# Patient Record
Sex: FEMALE | Race: WHITE | NOT HISPANIC OR LATINO | Employment: FULL TIME | URBAN - METROPOLITAN AREA
[De-identification: names, ages, dates, MRNs, and addresses within clinical notes are randomized per-mention and may not be internally consistent; named-entity substitution may affect disease eponyms.]

---

## 2017-01-17 RX ORDER — ETONOGESTREL/ETHINYL ESTRADIOL .12-.015MG
RING, VAGINAL VAGINAL
Qty: 3 EACH | Refills: 2 | OUTPATIENT
Start: 2017-01-17

## 2017-03-14 RX ORDER — ETONOGESTREL/ETHINYL ESTRADIOL .12-.015MG
RING, VAGINAL VAGINAL
Qty: 3 EACH | Refills: 1 | Status: SHIPPED | OUTPATIENT
Start: 2017-03-14 | End: 2017-11-10 | Stop reason: SDUPTHER

## 2017-11-10 ENCOUNTER — OFFICE VISIT (OUTPATIENT)
Dept: OBSTETRICS AND GYNECOLOGY | Facility: CLINIC | Age: 32
End: 2017-11-10

## 2017-11-10 VITALS
DIASTOLIC BLOOD PRESSURE: 80 MMHG | BODY MASS INDEX: 45.31 KG/M2 | HEIGHT: 61 IN | SYSTOLIC BLOOD PRESSURE: 128 MMHG | WEIGHT: 240 LBS

## 2017-11-10 DIAGNOSIS — Z13.89 SCREENING FOR BLOOD OR PROTEIN IN URINE: ICD-10-CM

## 2017-11-10 DIAGNOSIS — E66.01 MORBID OBESITY WITH BMI OF 45.0-49.9, ADULT (HCC): ICD-10-CM

## 2017-11-10 DIAGNOSIS — Z11.3 SCREENING FOR STD (SEXUALLY TRANSMITTED DISEASE): ICD-10-CM

## 2017-11-10 DIAGNOSIS — Z12.4 PAP SMEAR FOR CERVICAL CANCER SCREENING: ICD-10-CM

## 2017-11-10 DIAGNOSIS — Z01.419 WELL FEMALE EXAM WITH ROUTINE GYNECOLOGICAL EXAM: Primary | ICD-10-CM

## 2017-11-10 LAB
BILIRUB BLD-MCNC: NEGATIVE MG/DL
CLARITY, POC: CLEAR
COLOR UR: YELLOW
GLUCOSE UR STRIP-MCNC: NEGATIVE MG/DL
KETONES UR QL: NEGATIVE
LEUKOCYTE EST, POC: NEGATIVE
NITRITE UR-MCNC: NEGATIVE MG/ML
PH UR: 6.5 [PH] (ref 5–8)
PROT UR STRIP-MCNC: ABNORMAL MG/DL
RBC # UR STRIP: ABNORMAL /UL
SP GR UR: 1.02 (ref 1–1.03)
UROBILINOGEN UR QL: NORMAL

## 2017-11-10 PROCEDURE — 99395 PREV VISIT EST AGE 18-39: CPT | Performed by: OBSTETRICS & GYNECOLOGY

## 2017-11-10 PROCEDURE — 81002 URINALYSIS NONAUTO W/O SCOPE: CPT | Performed by: OBSTETRICS & GYNECOLOGY

## 2017-11-10 RX ORDER — ETONOGESTREL AND ETHINYL ESTRADIOL 11.7; 2.7 MG/1; MG/1
1 INSERT, EXTENDED RELEASE VAGINAL
Qty: 3 EACH | Refills: 4 | Status: SHIPPED | OUTPATIENT
Start: 2017-11-10 | End: 2018-11-14 | Stop reason: SDUPTHER

## 2017-11-10 NOTE — PROGRESS NOTES
[facility]  Routine Annual Visit    11/10/2017    Patient: Agustina Cisneros          MR#:[id number]      History of Present Illness    32 y.o. female  who presents for annual exam.        Patient's last menstrual period was 2017.  Obstetric History:  OB History      Para Term  AB Living    2 2 2   2    SAB TAB Ectopic Multiple Live Births        2         Menstrual History:     Patient's last menstrual period was 2017.       Sexual History:       ________________________________________  Patient Active Problem List   Diagnosis   • Well female exam with routine gynecological exam   • Morbid obesity with BMI of 45.0-49.9, adult       Past Medical History:   Diagnosis Date   • GERD (gastroesophageal reflux disease)    • Head ache     premenstral       Past Surgical History:   Procedure Laterality Date   •  SECTION         History   Smoking Status   • Never Smoker   Smokeless Tobacco   • Never Used       Family History   Problem Relation Age of Onset   • Asthma Mother    • Diabetes Father    • Lung cancer Maternal Grandmother 75   • Prostate cancer Maternal Grandfather 78   • Breast cancer Neg Hx    • Ovarian cancer Neg Hx    • Uterine cancer Neg Hx    • Colon cancer Neg Hx    • Melanoma Neg Hx        Prior to Admission medications    Medication Sig Start Date End Date Taking? Authorizing Provider   NUVARING 0.12-0.015 MG/24HR vaginal ring USE AS DIRECTED 3/14/17  Yes Merrick Naranjo MD     ________________________________________    Current contraception: NuvaRing vaginal inserts  History of abnormal Pap smear: no  Family history of uterine or ovarian cancer: no  Family History of colon cancer/colon polyps: no  History of abnormal mammogram: no  History of abnormal lipids: no    The following portions of the patient's history were reviewed and updated as appropriate: allergies, current medications, past family history, past medical history, past social  "history, past surgical history and problem list.    Review of Systems    Pertinent items are noted in HPI.     Objective   Physical Exam    /80  Ht 61\" (154.9 cm)  Wt 240 lb (109 kg)  LMP 11/01/2017  BMI 45.35 kg/m2   BP Readings from Last 3 Encounters:   11/10/17 128/80      Wt Readings from Last 3 Encounters:   11/10/17 240 lb (109 kg)        BMI: Estimated body mass index is 45.35 kg/(m^2) as calculated from the following:    Height as of this encounter: 61\" (154.9 cm).    Weight as of this encounter: 240 lb (109 kg).    General:   alert, appears stated age and cooperative   Heart: regular rate and rhythm, S1, S2 normal, no murmur, click, rub or gallop   Lungs: clear to auscultation bilaterally   Abdomen: soft, non-tender, without masses or organomegaly and obese   Breast: inspection negative, no nipple discharge or bleeding, no masses or nodularity palpable   Vulva: normal   Vagina: normal mucosa   Cervix: no lesions   Uterus: normal size or exam limits habitus   Adnexa: exam limited by habitus     As part of wellness and prevention, the following topics were discussed with the patient:     []  Nutrition  []  Physical activity/regular exercise   []  Healthy weight  []  Injury prevention  []  Substance misuse/abuse  []  Sexual behavior  []  STD prevention  [x]  Contaception  []  Dental health  []  Mental health  []  Immunization  [x]  Encouraged SBE       Assessment:    Agustina was seen today for gynecologic exam.    Diagnoses and all orders for this visit:    Well female exam with routine gynecological exam    Screening for blood or protein in urine  -     POC Urinalysis Dipstick    Pap smear for cervical cancer screening  -     IgP, Aptima HPV - ThinPrep Vial, Cervix    Morbid obesity with BMI of 45.0-49.9, adult    Screening for STD (sexually transmitted disease)  -     Chlamydia trachomatis, Neisseria gonorrhoeae, Trichomonas vaginalis, PCR - Swab, Cervix    Other orders  -     " etonogestrel-ethinyl estradiol (NUVARING) 0.12-0.015 MG/24HR vaginal ring; Insert 1 each into the vagina Every 28 (Twenty-Eight) Days.          Plan:  Return in about 1 year (around 11/10/2018) for Annual GYN exam.      Merrick Naranjo MD  11/10/2017 11:59 AM

## 2017-11-14 LAB
C TRACH RRNA SPEC QL NAA+PROBE: NEGATIVE
N GONORRHOEA RRNA SPEC QL NAA+PROBE: NEGATIVE
T VAGINALIS RRNA SPEC QL NAA+PROBE: NEGATIVE

## 2017-11-15 LAB
CYTOLOGIST CVX/VAG CYTO: NORMAL
CYTOLOGY CVX/VAG DOC THIN PREP: NORMAL
DX ICD CODE: NORMAL
HIV 1 & 2 AB SER-IMP: NORMAL
HPV I/H RISK 4 DNA CVX QL PROBE+SIG AMP: NEGATIVE
Lab: NORMAL
OTHER STN SPEC: NORMAL
PATH REPORT.FINAL DX SPEC: NORMAL
STAT OF ADQ CVX/VAG CYTO-IMP: NORMAL

## 2017-11-16 ENCOUNTER — TELEPHONE (OUTPATIENT)
Dept: OBSTETRICS AND GYNECOLOGY | Facility: CLINIC | Age: 32
End: 2017-11-16

## 2017-11-16 NOTE — TELEPHONE ENCOUNTER
----- Message from Merrick Naranjo MD sent at 11/15/2017 12:54 PM EST -----  Call pt:  PAP is negative.

## 2018-11-14 ENCOUNTER — OFFICE VISIT (OUTPATIENT)
Dept: OBSTETRICS AND GYNECOLOGY | Age: 33
End: 2018-11-14

## 2018-11-14 VITALS
DIASTOLIC BLOOD PRESSURE: 78 MMHG | WEIGHT: 246 LBS | HEIGHT: 61 IN | SYSTOLIC BLOOD PRESSURE: 104 MMHG | BODY MASS INDEX: 46.44 KG/M2

## 2018-11-14 DIAGNOSIS — Z13.89 SCREENING FOR BLOOD OR PROTEIN IN URINE: ICD-10-CM

## 2018-11-14 DIAGNOSIS — Z11.3 SCREENING FOR STD (SEXUALLY TRANSMITTED DISEASE): ICD-10-CM

## 2018-11-14 DIAGNOSIS — Z12.4 PAP SMEAR FOR CERVICAL CANCER SCREENING: ICD-10-CM

## 2018-11-14 DIAGNOSIS — E66.01 MORBID OBESITY WITH BMI OF 45.0-49.9, ADULT (HCC): ICD-10-CM

## 2018-11-14 DIAGNOSIS — Z01.419 WELL FEMALE EXAM WITH ROUTINE GYNECOLOGICAL EXAM: Primary | ICD-10-CM

## 2018-11-14 LAB
BILIRUB BLD-MCNC: NEGATIVE MG/DL
CLARITY, POC: CLEAR
COLOR UR: YELLOW
GLUCOSE UR STRIP-MCNC: NEGATIVE MG/DL
KETONES UR QL: NEGATIVE
LEUKOCYTE EST, POC: NEGATIVE
NITRITE UR-MCNC: NEGATIVE MG/ML
PH UR: 6 [PH] (ref 5–8)
PROT UR STRIP-MCNC: ABNORMAL MG/DL
RBC # UR STRIP: ABNORMAL /UL
SP GR UR: 1.03 (ref 1–1.03)
UROBILINOGEN UR QL: NORMAL

## 2018-11-14 PROCEDURE — 81002 URINALYSIS NONAUTO W/O SCOPE: CPT | Performed by: OBSTETRICS & GYNECOLOGY

## 2018-11-14 PROCEDURE — 99395 PREV VISIT EST AGE 18-39: CPT | Performed by: OBSTETRICS & GYNECOLOGY

## 2018-11-14 RX ORDER — ETONOGESTREL AND ETHINYL ESTRADIOL 11.7; 2.7 MG/1; MG/1
1 INSERT, EXTENDED RELEASE VAGINAL
Qty: 1 EACH | Refills: 12 | Status: SHIPPED | OUTPATIENT
Start: 2018-11-14 | End: 2019-11-14

## 2018-11-14 NOTE — PROGRESS NOTES
Routine Annual Visit    2018    Patient: Agustina Cisneros          MR#:[id number]      History of Present Illness    Chief Complaint   Patient presents with   • Gynecologic Exam       33 y.o. female  who presents for annual exam.    The patient presents for routine annual exam feeling well without complaints.  She uses NuvaRing for contraception and has been very happy with this method.    Patient's last menstrual period was 2018 (approximate).  Obstetric History:  OB History      Para Term  AB Living    2 2 2     2    SAB TAB Ectopic Molar Multiple Live Births              2         Menstrual History:     Patient's last menstrual period was 2018 (approximate).       Sexual History:       ________________________________________  Patient Active Problem List   Diagnosis   • Well female exam with routine gynecological exam   • Morbid obesity with BMI of 45.0-49.9, adult (CMS/[facility])       Past Medical History:   Diagnosis Date   • GERD (gastroesophageal reflux disease)    • Head ache     premenstral       Past Surgical History:   Procedure Laterality Date   •  SECTION     • WISDOM TOOTH EXTRACTION         Social History     Tobacco Use   Smoking Status Never Smoker   Smokeless Tobacco Never Used       Family History   Problem Relation Age of Onset   • Asthma Mother    • Diabetes Father    • Lung cancer Maternal Grandmother 75   • Diabetes Maternal Grandmother    • Prostate cancer Maternal Grandfather 78   • Breast cancer Neg Hx    • Ovarian cancer Neg Hx    • Uterine cancer Neg Hx    • Colon cancer Neg Hx    • Melanoma Neg Hx        Prior to Admission medications    Medication Sig Start Date End Date Taking? Authorizing Provider   etonogestrel-ethinyl estradiol (NUVARING) 0.12-0.015 MG/24HR vaginal ring Insert 1 each into the vagina Every 28 (Twenty-Eight) Days. 11/10/17  Yes Merrick Naranjo MD     ________________________________________    Current contraception:  "Nuvaring  History of abnormal Pap smear: no  Family history of uterine or ovarian cancer: no  Family History of colon cancer/colon polyps: no  History of abnormal mammogram: no  History of abnormal lipids: no    The following portions of the patient's history were reviewed and updated as appropriate: allergies, current medications, past family history, past medical history, past social history, past surgical history and problem list.    Review of Systems    Pertinent items are noted in HPI.     Objective   Physical Exam    /78   Ht 154.9 cm (61\")   Wt 112 kg (246 lb)   LMP 11/07/2018 (Approximate)   BMI 46.48 kg/m²    BP Readings from Last 3 Encounters:   11/14/18 104/78   11/10/17 128/80      Wt Readings from Last 3 Encounters:   11/14/18 112 kg (246 lb)   11/10/17 109 kg (240 lb)        BMI: Estimated body mass index is 46.48 kg/m² as calculated from the following:    Height as of this encounter: 154.9 cm (61\").    Weight as of this encounter: 112 kg (246 lb).       General: alert, appears stated age, cooperative and moderately obese   Heart: regular rate and rhythm, S1, S2 normal, no murmur, click, rub or gallop   Lungs: clear to auscultation bilaterally   Abdomen: soft, non-tender, without masses or organomegaly and protuberant   Breast: inspection negative, no nipple discharge or bleeding, no masses or nodularity palpable   Vulva: normal, Bartholin's, Urethra, [place]'s normal   Vagina: normal mucosa, normal discharge   Cervix: no lesions   Uterus: normal size or habitus limits exam    Adnexa: exam limited by habitus     As part of wellness and prevention, the following topics were discussed with the patient:     []  Nutrition  [x]  Physical activity/regular exercise   [x]  Healthy weight  []  Injury prevention  []  Smoking cessation  []  Substance misuse/abuse  []  Sexual behavior  []  STD prevention  []  Contaception  []  Dental health  []  Mental health  []  Immunization  [x]  Encouraged SBE  "       Assessment:    Agustina was seen today for gynecologic exam.    Diagnoses and all orders for this visit:    Well female exam with routine gynecological exam  -     IgP, Aptima HPV    Screening for blood or protein in urine  -     POC Urinalysis Dipstick    Pap smear for cervical cancer screening  -     IgP, Aptima HPV    Morbid obesity with BMI of 45.0-49.9, adult (CMS/[facility])    Screening for STD (sexually transmitted disease)  -     Chlamydia trachomatis, Neisseria gonorrhoeae, Trichomonas vaginalis, PCR - Swab, Cervix    Other orders  -     etonogestrel-ethinyl estradiol (NUVARING) 0.12-0.015 MG/24HR vaginal ring; Insert 1 each into the vagina Every 28 (Twenty-Eight) Days.          Plan:  Return in about 1 year (around 11/14/2019) for Annual GYN exam.      Merrick Naranjo MD  11/14/2018 11:59 AM

## 2018-11-16 LAB
C TRACH RRNA SPEC QL NAA+PROBE: NEGATIVE
N GONORRHOEA RRNA SPEC QL NAA+PROBE: NEGATIVE
T VAGINALIS RRNA VAG QL NAA+PROBE: NEGATIVE

## 2018-11-20 ENCOUNTER — TELEPHONE (OUTPATIENT)
Dept: OBSTETRICS AND GYNECOLOGY | Age: 33
End: 2018-11-20

## 2018-11-20 NOTE — TELEPHONE ENCOUNTER
----- Message from Merrick Naranjo MD sent at 11/19/2018  4:22 PM EST -----  Call pt:  PAP is negative.

## 2020-01-31 RX ORDER — ETONOGESTREL/ETHINYL ESTRADIOL .12-.015MG
RING, VAGINAL VAGINAL
Qty: 3 EACH | Refills: 4 | OUTPATIENT
Start: 2020-01-31

## 2023-11-22 ENCOUNTER — OFFICE VISIT (OUTPATIENT)
Dept: OBSTETRICS AND GYNECOLOGY | Age: 38
End: 2023-11-22
Payer: COMMERCIAL

## 2023-11-22 VITALS
BODY MASS INDEX: 47.95 KG/M2 | HEIGHT: 61 IN | WEIGHT: 254 LBS | DIASTOLIC BLOOD PRESSURE: 64 MMHG | SYSTOLIC BLOOD PRESSURE: 122 MMHG

## 2023-11-22 DIAGNOSIS — E66.01 MORBID OBESITY WITH BMI OF 45.0-49.9, ADULT: ICD-10-CM

## 2023-11-22 DIAGNOSIS — Z11.3 SCREENING EXAMINATION FOR STD (SEXUALLY TRANSMITTED DISEASE): ICD-10-CM

## 2023-11-22 DIAGNOSIS — Z11.51 SCREENING FOR HUMAN PAPILLOMAVIRUS (HPV): ICD-10-CM

## 2023-11-22 DIAGNOSIS — Z12.4 SCREENING FOR MALIGNANT NEOPLASM OF CERVIX: ICD-10-CM

## 2023-11-22 DIAGNOSIS — Z01.419 WELL FEMALE EXAM WITH ROUTINE GYNECOLOGICAL EXAM: Primary | ICD-10-CM

## 2023-11-22 DIAGNOSIS — K62.5 RECTAL BLEEDING: ICD-10-CM

## 2023-11-22 RX ORDER — ETONOGESTREL AND ETHINYL ESTRADIOL VAGINAL .015; .12 MG/D; MG/D
1 RING VAGINAL
Qty: 1 EACH | Refills: 12 | Status: SHIPPED | OUTPATIENT
Start: 2023-11-22 | End: 2024-11-21

## 2023-11-22 NOTE — PROGRESS NOTES
[facility]   Obstetrics and Gynecology     2023    Patient: Agustina Cisneros          MR#:[id number]    History of Present Illness    Chief Complaint   Patient presents with    Gynecologic Exam     CC: New gyn, former patient, AE, last pap 2018 nml HPV neg, pt states blood in stool x2 months.       38 y.o. female  who presents for annual exam.    The patient was her previous established in the practice has not been seen since 2018.  She reports rectal bleeding 4-5 times a week for the past 2 months.    Relevant data reviewed:    Patient's last menstrual period was 2023 (exact date).  Obstetric History:  OB History          2    Para   2    Term   2            AB        Living   2         SAB        IAB        Ectopic        Molar        Multiple        Live Births   2               Menstrual History:     Patient's last menstrual period was 2023 (exact date).       Social History     Substance and Sexual Activity   Sexual Activity Yes    Partners: Male    Birth control/protection: Inserts    Comment: nuva ring     ______________________________________  Patient Active Problem List   Diagnosis    Morbid obesity with BMI of 45.0-49.9, adult    Rectal bleeding     Past Medical History:   Diagnosis Date    GERD (gastroesophageal reflux disease)     Head ache     premenstral     Past Surgical History:   Procedure Laterality Date     SECTION      WISDOM TOOTH EXTRACTION       Social History     Tobacco Use   Smoking Status Never   Smokeless Tobacco Never     Family History   Problem Relation Age of Onset    Asthma Mother     Diabetes Father     Lung cancer Maternal Grandmother 75    Diabetes Maternal Grandmother     Prostate cancer Maternal Grandfather 78    Breast cancer Neg Hx     Ovarian cancer Neg Hx     Uterine cancer Neg Hx     Colon cancer Neg Hx     Melanoma Neg Hx      Prior to Admission medications    Not on File  "    _______________________________________    Current contraception: none  History of abnormal Pap smear: no  Family history of uterine or ovarian cancer: no  Family History of colon cancer/colon polyps: no  History of abnormal mammogram: no  History of abnormal lipids: no    The following portions of the patient's history were reviewed and updated as appropriate: allergies, current medications, past family history, past medical history, past social history, past surgical history, and problem list.    Review of Systems    Pertinent items are noted in HPI.       Objective   Physical Exam    /64   Ht 154.9 cm (61\")   Wt 115 kg (254 lb)   LMP 11/09/2023 (Exact Date)   BMI 47.99 kg/m²    BP Readings from Last 3 Encounters:   11/22/23 122/64   11/14/18 104/78   11/10/17 128/80      Wt Readings from Last 3 Encounters:   11/22/23 115 kg (254 lb)   11/14/18 112 kg (246 lb)   11/10/17 109 kg (240 lb)        BMI: Estimated body mass index is 47.99 kg/m² as calculated from the following:    Height as of this encounter: 154.9 cm (61\").    Weight as of this encounter: 115 kg (254 lb).       General: alert, appears stated age, cooperative, and moderately obese   Heart: regular rate and rhythm, S1, S2 normal, no murmur, click, rub or gallop   Lungs: clear to auscultation bilaterally   Abdomen: soft, non-tender, without masses, no organomegaly   Breast: inspection negative, no nipple discharge or bleeding, no masses or nodularity palpable   External genitalia/Vulva: External genitalia including bartholin's glands, Urethra, [place]'s gland and urethra meatus are normal, Perineum, rectum and anus appear normal , and Bladder appears normal without significant prolapse    Vagina: normal mucosa, normal discharge   Cervix: no lesions   Uterus: normal size and non-tender   Adnexa: exam limited by habitus     As part of wellness and prevention, the following topics were discussed with the patient:  Encouraged self breast " exam  Physical activity and regular exercised encouraged.         Problem List   Meds  History  Prep for Surg   Imagin}    Assessment:  Diagnoses and all orders for this visit:    1. Well female exam with routine gynecological exam (Primary)  -     IGP, Apt HPV,rfx 16 / 18,45    2. Screening for human papillomavirus (HPV)  -     IGP, Apt HPV,rfx 16 / 18,45    3. Screening for malignant neoplasm of cervix  -     IGP, Apt HPV,rfx 16 / 18,45    4. Morbid obesity with BMI of 45.0-49.9, adult    5. Rectal bleeding  -     Ambulatory Referral For Screening Colonoscopy    Other orders  -     etonogestrel-ethinyl estradiol (NuvaRing) 0.12-0.015 MG/24HR vaginal ring; Insert 1 each into the vagina Every 28 (Twenty-Eight) Days. Insert vaginally and leave in place for 3 consecutive weeks, then remove for 1 week.  Dispense: 1 each; Refill: 12      Plan:  Return in 1 year (on 2024) for Annual exam.    Merrick Naranjo MD  2023 11:20 EST

## 2023-11-26 LAB
A VAGINAE DNA VAG QL NAA+PROBE: NORMAL SCORE
BVAB2 DNA VAG QL NAA+PROBE: NORMAL SCORE
C ALBICANS DNA VAG QL NAA+PROBE: NEGATIVE
C GLABRATA DNA VAG QL NAA+PROBE: NEGATIVE
C TRACH DNA VAG QL NAA+PROBE: NEGATIVE
MEGA1 DNA VAG QL NAA+PROBE: NORMAL SCORE
N GONORRHOEA DNA VAG QL NAA+PROBE: NEGATIVE
T VAGINALIS DNA VAG QL NAA+PROBE: NEGATIVE

## 2023-11-27 ENCOUNTER — PATIENT ROUNDING (BHMG ONLY) (OUTPATIENT)
Dept: OBSTETRICS AND GYNECOLOGY | Age: 38
End: 2023-11-27
Payer: COMMERCIAL

## 2023-11-27 LAB
CYTOLOGIST CVX/VAG CYTO: NORMAL
CYTOLOGY CVX/VAG DOC CYTO: NORMAL
CYTOLOGY CVX/VAG DOC THIN PREP: NORMAL
DX ICD CODE: NORMAL
HIV 1 & 2 AB SER-IMP: NORMAL
HPV I/H RISK 4 DNA CVX QL PROBE+SIG AMP: NEGATIVE
OTHER STN SPEC: NORMAL
STAT OF ADQ CVX/VAG CYTO-IMP: NORMAL

## 2023-11-27 NOTE — PROGRESS NOTES
A MY CHART MESSAGE HAS BEEN SENT TO THE PATIENT FOR [facility].

## 2023-12-05 ENCOUNTER — OFFICE VISIT (OUTPATIENT)
Dept: SURGERY | Facility: CLINIC | Age: 38
End: 2023-12-05
Payer: COMMERCIAL

## 2023-12-05 VITALS
BODY MASS INDEX: 48.64 KG/M2 | SYSTOLIC BLOOD PRESSURE: 150 MMHG | WEIGHT: 257.6 LBS | DIASTOLIC BLOOD PRESSURE: 98 MMHG | HEIGHT: 61 IN

## 2023-12-05 DIAGNOSIS — K62.5 RECTAL BLEEDING: Primary | ICD-10-CM

## 2023-12-05 NOTE — PROGRESS NOTES
Subjective   Agustina Cisneros is a 38 y.o. female who presents to the office in surgical consultation from Merrick Naranjo MD for rectal bleeding.    History of present illness  The patient has a several week history of rectal bleeding.  She notices blood in the substance of her stool as well as on the surface and on the toilet paper.  She does not have any associated abdominal pain.  She has not had bloody diarrhea.  Her appetite has remained normal and she is having regular bowel function.  She has never had a previous colonoscopy.  She has a family history of colon cancer in her grandfather who was diagnosed in his mid to late 60s.    Review of Systems   Constitutional:  Negative for fatigue and fever.   Respiratory:  Negative for chest tightness and shortness of breath.    Cardiovascular:  Negative for chest pain and palpitations.   Gastrointestinal:  Positive for blood in stool and hematochezia. Negative for abdominal pain, constipation, diarrhea, nausea and vomiting.     Past Medical History:   Diagnosis Date    GERD (gastroesophageal reflux disease)     Head ache     premenstral     Past Surgical History:   Procedure Laterality Date     SECTION  2012    WISDOM TOOTH EXTRACTION       Family History   Problem Relation Age of Onset    Asthma Mother     Diabetes Father     Lung cancer Maternal Grandmother 75    Diabetes Maternal Grandmother     Prostate cancer Maternal Grandfather 78    Breast cancer Neg Hx     Ovarian cancer Neg Hx     Uterine cancer Neg Hx     Colon cancer Neg Hx     Melanoma Neg Hx      Social History     Socioeconomic History    Marital status:      Spouse name: SONIA    Number of children: 2   Tobacco Use    Smoking status: Never    Smokeless tobacco: Never   Vaping Use    Vaping Use: Never used   Substance and Sexual Activity    Alcohol use: No    Drug use: No    Sexual activity: Yes     Partners: Male     Birth control/protection: Inserts     Comment: nuva ring        Objective   Physical Exam  Constitutional:       Appearance: Normal appearance. She is well-developed. She is not toxic-appearing.   Eyes:      General: No scleral icterus.  Pulmonary:      Effort: Pulmonary effort is normal. No respiratory distress.   Abdominal:      Palpations: Abdomen is soft.      Tenderness: There is no abdominal tenderness.   Skin:     General: Skin is warm and dry.   Neurological:      Mental Status: She is alert and oriented to person, place, and time.   Psychiatric:         Behavior: Behavior normal.         Thought Content: Thought content normal.         Judgment: Judgment normal.         Assessment & Plan     1.  Rectal bleeding: The patient has rectal bleeding that seems to include blood in the substance of the stool.  This would suggest that the source is not just perianal such as a hemorrhoid or a fissure.  There is concern that she may have a colonic source of the rectal bleeding.  This was discussed with her.  We will plan to proceed with a colonoscopy for further evaluation.  The patient understands the indications, alternatives, risks, and benefits of the procedure and wishes to proceed.

## 2023-12-05 NOTE — LETTER
2023     Merrick Naranjo MD     Patient: Agustina Cisneros   YOB: 1985   Date of Visit: 2023     Dear Merrick Naranjo MD:       Thank you for referring Agustina Cisneros to me for evaluation. Below are the relevant portions of my assessment and plan of care.    If you have questions, please do not hesitate to call me. I look forward to following Agustina along with you.         Sincerely,        Reji Mcdowell Jr., MD        CC:   No Recipients    Reji Mcdowell Jr., MD  23 1537  Sign when Signing Visit  Subjective  Agustina Cisneros is a 38 y.o. female who presents to the office in surgical consultation from Merrick Naranjo MD for rectal bleeding.    History of present illness  The patient has a several week history of rectal bleeding.  She notices blood in the substance of her stool as well as on the surface and on the toilet paper.  She does not have any associated abdominal pain.  She has not had bloody diarrhea.  Her appetite has remained normal and she is having regular bowel function.  She has never had a previous colonoscopy.  She has a family history of colon cancer in her grandfather who was diagnosed in his mid to late 60s.    Review of Systems   Constitutional:  Negative for fatigue and fever.   Respiratory:  Negative for chest tightness and shortness of breath.    Cardiovascular:  Negative for chest pain and palpitations.   Gastrointestinal:  Positive for blood in stool and hematochezia. Negative for abdominal pain, constipation, diarrhea, nausea and vomiting.     Past Medical History:   Diagnosis Date   • GERD (gastroesophageal reflux disease)    • Head ache     premenstral     Past Surgical History:   Procedure Laterality Date   •  SECTION     • WISDOM TOOTH EXTRACTION       Family History   Problem Relation Age of Onset   • Asthma Mother    • Diabetes Father    • Lung cancer Maternal Grandmother 75   • Diabetes Maternal Grandmother    • Prostate  cancer Maternal Grandfather 78   • Breast cancer Neg Hx    • Ovarian cancer Neg Hx    • Uterine cancer Neg Hx    • Colon cancer Neg Hx    • Melanoma Neg Hx      Social History     Socioeconomic History   • Marital status:      Spouse name: SONIA   • Number of children: 2   Tobacco Use   • Smoking status: Never   • Smokeless tobacco: Never   Vaping Use   • Vaping Use: Never used   Substance and Sexual Activity   • Alcohol use: No   • Drug use: No   • Sexual activity: Yes     Partners: Male     Birth control/protection: Inserts     Comment: nuva ring       Objective  Physical Exam  Constitutional:       Appearance: Normal appearance. She is well-developed. She is not toxic-appearing.   Eyes:      General: No scleral icterus.  Pulmonary:      Effort: Pulmonary effort is normal. No respiratory distress.   Abdominal:      Palpations: Abdomen is soft.      Tenderness: There is no abdominal tenderness.   Skin:     General: Skin is warm and dry.   Neurological:      Mental Status: She is alert and oriented to person, place, and time.   Psychiatric:         Behavior: Behavior normal.         Thought Content: Thought content normal.         Judgment: Judgment normal.         Assessment & Plan    1.  Rectal bleeding: The patient has rectal bleeding that seems to include blood in the substance of the stool.  This would suggest that the source is not just perianal such as a hemorrhoid or a fissure.  There is concern that she may have a colonic source of the rectal bleeding.  This was discussed with her.  We will plan to proceed with a colonoscopy for further evaluation.  The patient understands the indications, alternatives, risks, and benefits of the procedure and wishes to proceed.

## 2023-12-07 ENCOUNTER — PATIENT ROUNDING (BHMG ONLY) (OUTPATIENT)
Dept: SURGERY | Facility: CLINIC | Age: 38
End: 2023-12-07
Payer: COMMERCIAL

## 2023-12-07 NOTE — PROGRESS NOTES
December 7, 2023    Hello, may I speak with Agustina Cisneros?    My name is Patel       I am  with [facility]  [address]  [place] [id number] [phone].    Before we get started may I verify your date of birth? 1985    I am calling to officially welcome you to our practice and ask about your recent visit. Is this a good time to talk? yes    Tell me about your visit with us. What things went well?  Everything went well.        We're always looking for ways to make our patients' experiences even better. Do you have recommendations on ways we may improve?  no    Overall were you satisfied with your first visit to our practice? yes       I appreciate you taking the time to speak with me today. Is there anything else I can do for you? no      Thank you, and have a great day.

## 2024-01-05 ENCOUNTER — ANESTHESIA (OUTPATIENT)
Dept: GASTROENTEROLOGY | Facility: HOSPITAL | Age: 39
End: 2024-01-05
Payer: COMMERCIAL

## 2024-01-05 ENCOUNTER — HOSPITAL ENCOUNTER (OUTPATIENT)
Facility: HOSPITAL | Age: 39
Setting detail: HOSPITAL OUTPATIENT SURGERY
Discharge: HOME | End: 2024-01-05
Attending: SURGERY | Admitting: SURGERY
Payer: COMMERCIAL

## 2024-01-05 ENCOUNTER — ANESTHESIA EVENT (OUTPATIENT)
Dept: GASTROENTEROLOGY | Facility: HOSPITAL | Age: 39
End: 2024-01-05
Payer: COMMERCIAL

## 2024-01-05 VITALS
WEIGHT: 249.5 LBS | BODY MASS INDEX: 47.11 KG/M2 | RESPIRATION RATE: 14 BRPM | SYSTOLIC BLOOD PRESSURE: 133 MMHG | HEART RATE: 81 BPM | OXYGEN SATURATION: 98 % | DIASTOLIC BLOOD PRESSURE: 98 MMHG | HEIGHT: 61 IN

## 2024-01-05 DIAGNOSIS — K62.5 RECTAL BLEEDING: ICD-10-CM

## 2024-01-05 LAB
B-HCG UR QL: NEGATIVE
EXPIRATION DATE: NORMAL
INTERNAL NEGATIVE CONTROL: NEGATIVE
INTERNAL POSITIVE CONTROL: POSITIVE
Lab: NORMAL

## 2024-01-05 PROCEDURE — S0260 H&P FOR SURGERY: HCPCS | Performed by: SURGERY

## 2024-01-05 PROCEDURE — 25810000003 LACTATED RINGERS PER 1000 ML: Performed by: SURGERY

## 2024-01-05 PROCEDURE — 25010000002 PROPOFOL 10 MG/ML EMULSION: Performed by: NURSE ANESTHETIST, CERTIFIED REGISTERED

## 2024-01-05 PROCEDURE — 45380 COLONOSCOPY AND BIOPSY: CPT | Performed by: SURGERY

## 2024-01-05 PROCEDURE — 88305 TISSUE EXAM BY PATHOLOGIST: CPT | Performed by: SURGERY

## 2024-01-05 PROCEDURE — 45381 COLONOSCOPY SUBMUCOUS NJX: CPT | Performed by: SURGERY

## 2024-01-05 PROCEDURE — 81025 URINE PREGNANCY TEST: CPT | Performed by: SURGERY

## 2024-01-05 RX ORDER — SODIUM CHLORIDE, SODIUM LACTATE, POTASSIUM CHLORIDE, CALCIUM CHLORIDE 600; 310; 30; 20 MG/100ML; MG/100ML; MG/100ML; MG/100ML
30 INJECTION, SOLUTION INTRAVENOUS CONTINUOUS PRN
Status: DISCONTINUED | OUTPATIENT
Start: 2024-01-05 | End: 2024-01-05 | Stop reason: HOSPADM

## 2024-01-05 RX ORDER — PROPOFOL 10 MG/ML
VIAL (ML) INTRAVENOUS CONTINUOUS PRN
Status: DISCONTINUED | OUTPATIENT
Start: 2024-01-05 | End: 2024-01-05 | Stop reason: SURG

## 2024-01-05 RX ORDER — LIDOCAINE HYDROCHLORIDE 20 MG/ML
INJECTION, SOLUTION INFILTRATION; PERINEURAL AS NEEDED
Status: DISCONTINUED | OUTPATIENT
Start: 2024-01-05 | End: 2024-01-05 | Stop reason: SURG

## 2024-01-05 RX ADMIN — LIDOCAINE HYDROCHLORIDE 60 MG: 20 INJECTION, SOLUTION INFILTRATION; PERINEURAL at 08:39

## 2024-01-05 RX ADMIN — SODIUM CHLORIDE, POTASSIUM CHLORIDE, SODIUM LACTATE AND CALCIUM CHLORIDE 30 ML/HR: 600; 310; 30; 20 INJECTION, SOLUTION INTRAVENOUS at 08:10

## 2024-01-05 RX ADMIN — PROPOFOL 70 MG: 10 INJECTION, EMULSION INTRAVENOUS at 08:30

## 2024-01-05 RX ADMIN — PROPOFOL 30 MG: 10 INJECTION, EMULSION INTRAVENOUS at 08:36

## 2024-01-05 RX ADMIN — PROPOFOL 200 MCG/KG/MIN: 10 INJECTION, EMULSION INTRAVENOUS at 08:29

## 2024-01-05 RX ADMIN — LIDOCAINE HYDROCHLORIDE 100 MG: 20 INJECTION, SOLUTION INFILTRATION; PERINEURAL at 08:28

## 2024-01-05 NOTE — OP NOTE
Surgeon:     Reji Mcdowell Jr., M.D.    Preoperative Diagnosis:     Rectal bleeding    Postoperative Diagnosis:     Rectosigmoid mass    Procedure Performed:     Colonoscopy with biopsy rectosigmoid mass and tattooing    Indications:     The patient is a pleasant 38-year-old female who has recently had rectal bleeding.  She presents for colonoscopy.  The patient understands the indications, alternatives, risks, and benefits of the procedure and wishes to proceed.    Procedure:     The patient was identified, taken to the endoscopy suite, and place in the left side down decubitus procedure.  The patient underwent a MAC anesthesia and was appropriately monitored through the case by the anesthesia personnel.  A rectal exam was performed that was normal.  The colonoscope was introduced into the rectum and advanced very carefully to the cecum that was identified by the cecal strap, ileocecal valve, and the appendiceal orifice.  The scope was then slowly withdrawn with careful circumferential examination of the mucosa performed.  The bowel prep was good allowing adequate visualization of mucosal surfaces.  The scope was withdrawn.  The patient tolerated the procedure well and was transferred the recovery area in stable condition.     Findings:    There was a circumferential partially obstructing mass involving the rectosigmoid colon that extended from about 15 cm from the anal verge to almost 25 cm.  Multiple biopsies were taken with cold biopsy forceps and retrieved.  The distal margin was tattooed.    Recommendations:     1.  Await pathology results from the biopsy of the rectosigmoid mass.  2.  Check CT scan of the abdomen and pelvis for staging purposes.  3.  The patient will require a low anterior resection.    Reji Mcdowell Jr., M.D.

## 2024-01-05 NOTE — ANESTHESIA POSTPROCEDURE EVALUATION
Patient: Agustina Cisneros    Procedure Summary       Date: 01/05/24 Room / Location:  [facility] /  [facility]    Anesthesia Start: 0826 Anesthesia Stop: 0904    Procedure: COLONOSCOPY to cecum with rectosigmoid mass biopsies and 3ml tattoo Diagnosis:       Rectal bleeding      (Rectal bleeding [K62.5])    Surgeons: Reji Mcdowell Jr., MD Provider: Kam Royal MD    Anesthesia Type: MAC ASA Status: 2            Anesthesia Type: MAC    Vitals  Vitals Value Taken Time   /98 01/05/24 0926   Temp     Pulse 81 01/05/24 0926   Resp 14 01/05/24 0926   SpO2 98 % 01/05/24 0926           Post Anesthesia Care and Evaluation    Patient location during evaluation: bedside  Patient participation: complete - patient participated  Level of consciousness: awake  Pain management: adequate    Airway patency: patent  Anesthetic complications: No anesthetic complications  PONV Status: none  Cardiovascular status: acceptable  Respiratory status: acceptable  Hydration status: acceptable  Post Neuraxial Block status: Motor and sensory function returned to baseline

## 2024-01-05 NOTE — ANESTHESIA PREPROCEDURE EVALUATION
Anesthesia Evaluation     Patient summary reviewed and Nursing notes reviewed   NPO Solid Status: > 8 hours  NPO Liquid Status: > 2 hours           Airway   Mallampati: II  TM distance: >3 FB  Neck ROM: full  No difficulty expected  Dental      Pulmonary - negative pulmonary ROS   Cardiovascular - negative cardio ROS  Exercise tolerance: good (4-7 METS)        Neuro/Psych  (+) headaches  GI/Hepatic/Renal/Endo    (+) obesity, GERD, GI bleeding lower resolved    Musculoskeletal (-) negative ROS    Abdominal   (+) obese   Substance History - negative use     OB/GYN negative ob/gyn ROS         Other                    Anesthesia Plan    ASA 2     MAC     intravenous induction     Anesthetic plan, risks, benefits, and alternatives have been provided, discussed and informed consent has been obtained with: patient.  Pre-procedure education provided    CODE STATUS:

## 2024-01-05 NOTE — H&P
Subjective   Agustina Cisneros is a 38 y.o. female who presents for rectal bleeding.     History of present illness  The patient has a several week history of rectal bleeding.  She notices blood in the substance of her stool as well as on the surface and on the toilet paper.  She does not have any associated abdominal pain.  She has not had bloody diarrhea.  Her appetite has remained normal and she is having regular bowel function.  She has never had a previous colonoscopy.  She has a family history of colon cancer in her grandfather who was diagnosed in his mid to late 60s.     Review of Systems   Constitutional:  Negative for fatigue and fever.   Respiratory:  Negative for chest tightness and shortness of breath.    Cardiovascular:  Negative for chest pain and palpitations.   Gastrointestinal:  Positive for blood in stool and hematochezia. Negative for abdominal pain, constipation, diarrhea, nausea and vomiting.      Medical History        Past Medical History:   Diagnosis Date    GERD (gastroesophageal reflux disease)      Head ache       premenstral         Surgical History         Past Surgical History:   Procedure Laterality Date     SECTION       WISDOM TOOTH EXTRACTION                   Family History   Problem Relation Age of Onset    Asthma Mother      Diabetes Father      Lung cancer Maternal Grandmother 75    Diabetes Maternal Grandmother      Prostate cancer Maternal Grandfather 78    Breast cancer Neg Hx      Ovarian cancer Neg Hx      Uterine cancer Neg Hx      Colon cancer Neg Hx      Melanoma Neg Hx        Social History   Social History            Socioeconomic History    Marital status:        Spouse name: SONIA    Number of children: 2   Tobacco Use    Smoking status: Never    Smokeless tobacco: Never   Vaping Use    Vaping Use: Never used   Substance and Sexual Activity    Alcohol use: No    Drug use: No    Sexual activity: Yes       Partners: Male       Birth  control/protection: Inserts       Comment: nuva ring                  Objective   Physical Exam  Constitutional:       Appearance: Normal appearance. She is well-developed. She is not toxic-appearing.   Eyes:      General: No scleral icterus.  Pulmonary:      Effort: Pulmonary effort is normal. No respiratory distress.   Abdominal:      Palpations: Abdomen is soft.      Tenderness: There is no abdominal tenderness.   Skin:     General: Skin is warm and dry.   Neurological:      Mental Status: She is alert and oriented to person, place, and time.   Psychiatric:         Behavior: Behavior normal.         Thought Content: Thought content normal.         Judgment: Judgment normal.                  Assessment & Plan  1.  Rectal bleeding: The patient has rectal bleeding that seems to include blood in the substance of the stool.  This would suggest that the source is not just perianal such as a hemorrhoid or a fissure.  There is concern that she may have a colonic source of the rectal bleeding.  This was discussed with her.  We will plan to proceed with a colonoscopy for further evaluation.  The patient understands the indications, alternatives, risks, and benefits of the procedure and wishes to proceed.

## 2024-01-08 DIAGNOSIS — C18.7 MALIGNANT NEOPLASM OF SIGMOID COLON: Primary | ICD-10-CM

## 2024-01-09 ENCOUNTER — TELEPHONE (OUTPATIENT)
Dept: SURGERY | Facility: CLINIC | Age: 39
End: 2024-01-09
Payer: COMMERCIAL

## 2024-01-09 NOTE — TELEPHONE ENCOUNTER
The patient was called on telephone to discuss the pathology results from the colonoscopy.  It does show an adenocarcinoma.  She did not answer.  A voicemail was left with her that I will try to call her tomorrow.

## 2024-01-09 NOTE — TELEPHONE ENCOUNTER
SPOKE TO PT REGARDING THE CT ABD/PELVIS SCHEDULED ON 1/11 @ 3:15 PM, ARRIVAL 2 PM FOR [place]. INSTRUCTED PT TO HAVE LIQUIDS ONLY 4 HRS PRIOR TO THE APPT.

## 2024-01-11 ENCOUNTER — TELEPHONE (OUTPATIENT)
Dept: SURGERY | Facility: CLINIC | Age: 39
End: 2024-01-11
Payer: COMMERCIAL

## 2024-01-11 ENCOUNTER — HOSPITAL ENCOUNTER (OUTPATIENT)
Facility: HOSPITAL | Age: 39
Discharge: HOME | End: 2024-01-11
Admitting: SURGERY
Payer: COMMERCIAL

## 2024-01-11 DIAGNOSIS — C18.7 MALIGNANT NEOPLASM OF SIGMOID COLON: ICD-10-CM

## 2024-01-11 PROCEDURE — 0 DIATRIZOATE MEGLUMINE & SODIUM PER 1 ML: Performed by: SURGERY

## 2024-01-11 PROCEDURE — 25510000001 IOPAMIDOL 61 % SOLUTION: Performed by: SURGERY

## 2024-01-11 PROCEDURE — 74177 CT ABD & PELVIS W/CONTRAST: CPT

## 2024-01-11 RX ADMIN — IOPAMIDOL 85 ML: 612 INJECTION, SOLUTION INTRAVENOUS at 14:50

## 2024-01-11 RX ADMIN — DIATRIZOATE MEGLUMINE AND DIATRIZOATE SODIUM 30 ML: 600; 100 SOLUTION ORAL; RECTAL at 13:42

## 2024-01-11 NOTE — TELEPHONE ENCOUNTER
The patient was called on telephone and the pathology from the colonoscopy was discussed with her.  She has adenocarcinoma of the sigmoid colon.  The CT scan of the abdomen and pelvis was also discussed which shows thickening of the sigmoid colon but no evidence of metastatic disease.  She will see me in the office next week at which time surgery will be discussed.

## 2024-01-12 LAB
LAB AP CASE REPORT: NORMAL
LAB AP DIAGNOSIS COMMENT: NORMAL
PATH REPORT.ADDENDUM SPEC: NORMAL
PATH REPORT.FINAL DX SPEC: NORMAL
PATH REPORT.GROSS SPEC: NORMAL

## 2024-01-18 ENCOUNTER — OFFICE VISIT (OUTPATIENT)
Dept: SURGERY | Facility: CLINIC | Age: 39
End: 2024-01-18
Payer: COMMERCIAL

## 2024-01-18 VITALS
DIASTOLIC BLOOD PRESSURE: 88 MMHG | SYSTOLIC BLOOD PRESSURE: 126 MMHG | HEIGHT: 61 IN | BODY MASS INDEX: 47.01 KG/M2 | WEIGHT: 249 LBS

## 2024-01-18 DIAGNOSIS — C18.7 MALIGNANT NEOPLASM OF SIGMOID COLON: Primary | ICD-10-CM

## 2024-01-18 PROCEDURE — 99214 OFFICE O/P EST MOD 30 MIN: CPT | Performed by: SURGERY

## 2024-01-18 RX ORDER — ERYTHROMYCIN 500 MG/1
500 TABLET, COATED ORAL 3 TIMES DAILY
Qty: 6 TABLET | Refills: 0 | Status: SHIPPED | OUTPATIENT
Start: 2024-01-18

## 2024-01-18 RX ORDER — NEOMYCIN SULFATE 500 MG/1
1000 TABLET ORAL 3 TIMES DAILY
Qty: 6 TABLET | Refills: 0 | Status: SHIPPED | OUTPATIENT
Start: 2024-01-18 | End: 2024-01-19

## 2024-01-18 NOTE — H&P (VIEW-ONLY)
Subjective   Agustina Cisneros is a 38 y.o. female who returns to the office after undergoing a colonoscopy which identified a sigmoid colon cancer.    History of Present Illness     The patient was seen in our office on 2023 for rectal bleeding.  After discussion with her, it was clearly established that the blood was in the substance of the stool and not just perianal source.  She underwent a colonoscopy on 2024 which identified a long segment sigmoid colon cancer that extended from 15 cm from anal verge all the way to 25 cm from the anal verge.  Biopsies show adenocarcinoma.  She had a CT scan of the abdomen and pelvis on 2024 that showed no evidence of metastatic disease.    Review of Systems   Constitutional:  Negative for fatigue and fever.   Respiratory:  Negative for chest tightness and shortness of breath.    Cardiovascular:  Negative for chest pain and palpitations.   Gastrointestinal:  Negative for abdominal pain, blood in stool, constipation, diarrhea, nausea and vomiting.     Past Medical History:   Diagnosis Date    Blood in stool     GERD (gastroesophageal reflux disease)     Head ache     premenstral     Past Surgical History:   Procedure Laterality Date     SECTION      COLONOSCOPY N/A 2024    Procedure: COLONOSCOPY to cecum with rectosigmoid mass biopsies and 3ml tattoo;  Surgeon: Reji Mcdowell Jr., MD;  Location: [facility] END[facility];  Service: General;  Laterality: N/A;  pre- rectal bleeding  post- rectosigmoid mass    WISDOM TOOTH EXTRACTION       Family History   Problem Relation Age of Onset    Asthma Mother     Diabetes Father     Lung cancer Maternal Grandmother 75    Diabetes Maternal Grandmother     Prostate cancer Maternal Grandfather 78    Breast cancer Neg Hx     Ovarian cancer Neg Hx     Uterine cancer Neg Hx     Colon cancer Neg Hx     Melanoma Neg Hx     Malig Hyperthermia Neg Hx      Social History     Socioeconomic History    Marital status:       Spouse name: SONIA    Number of children: 2   Tobacco Use    Smoking status: Never    Smokeless tobacco: Never   Vaping Use    Vaping Use: Never used   Substance and Sexual Activity    Alcohol use: No    Drug use: No    Sexual activity: Yes     Partners: Male     Birth control/protection: Vaginal contraceptive ring, Inserts     Comment: nuva ring       Objective   Physical Exam  Constitutional:       Appearance: Normal appearance. She is well-developed. She is not toxic-appearing.   Eyes:      General: No scleral icterus.  Pulmonary:      Effort: Pulmonary effort is normal. No respiratory distress.   Abdominal:      Palpations: Abdomen is soft.      Tenderness: There is no abdominal tenderness.   Skin:     General: Skin is warm and dry.   Neurological:      Mental Status: She is alert and oriented to person, place, and time.   Psychiatric:         Behavior: Behavior normal.         Thought Content: Thought content normal.         Judgment: Judgment normal.       Class 3 Severe Obesity (BMI >=40). Obesity-related health conditions include the following: none. Obesity is improving with lifestyle modifications. BMI is is above average; BMI management plan is completed. We discussed low calorie, low carb based diet program, portion control, and increasing exercise.      Assessment & Plan     1.  Sigmoid colon cancer: The patient has long segment sigmoid colon cancer with no evidence of metastatic disease on CT scan of the abdomen and pelvis.  This was discussed with her.  She will be scheduled for a da Kat robot-assisted laparoscopic sigmoid colon resection.  He understands that a diverting loop ileostomy may be necessary depending on operative findings and location of the anastomosis.  The patient understands the indications, alternatives, risks, and benefits of the procedure and wishes to proceed.

## 2024-01-18 NOTE — LETTER
2024       No Recipients    Patient: Agustina Cisneros   YOB: 1985   Date of Visit: 2024     Dear   No Recipients:       Thank you for referring Agustina Cisneros to me for evaluation. Below are the relevant portions of my assessment and plan of care.    If you have questions, please do not hesitate to call me. I look forward to following Agustina along with you.         Sincerely,        Reji Mcdowell Jr., MD        CC:   No Recipients    Reji Mcodwell Jr., MD  24 [phone]  Sign when Signing Visit  Subjective  Agustina Cisneros is a 38 y.o. female who returns to the office after undergoing a colonoscopy which identified a sigmoid colon cancer.    History of Present Illness     The patient was seen in our office on 2023 for rectal bleeding.  After discussion with her, it was clearly established that the blood was in the substance of the stool and not just perianal source.  She underwent a colonoscopy on 2024 which identified a long segment sigmoid colon cancer that extended from 15 cm from anal verge all the way to 25 cm from the anal verge.  Biopsies show adenocarcinoma.  She had a CT scan of the abdomen and pelvis on 2024 that showed no evidence of metastatic disease.    Review of Systems   Constitutional:  Negative for fatigue and fever.   Respiratory:  Negative for chest tightness and shortness of breath.    Cardiovascular:  Negative for chest pain and palpitations.   Gastrointestinal:  Negative for abdominal pain, blood in stool, constipation, diarrhea, nausea and vomiting.     Past Medical History:   Diagnosis Date    Blood in stool     GERD (gastroesophageal reflux disease)     Head ache     premenstral     Past Surgical History:   Procedure Laterality Date     SECTION  2012    COLONOSCOPY N/A 2024    Procedure: COLONOSCOPY to cecum with rectosigmoid mass biopsies and 3ml tattoo;  Surgeon: Reji Mcdowell Jr., MD;  Location: [facility] END[facility]COPY;   Service: General;  Laterality: N/A;  pre- rectal bleeding  post- rectosigmoid mass    WISDOM TOOTH EXTRACTION       Family History   Problem Relation Age of Onset    Asthma Mother     Diabetes Father     Lung cancer Maternal Grandmother 75    Diabetes Maternal Grandmother     Prostate cancer Maternal Grandfather 78    Breast cancer Neg Hx     Ovarian cancer Neg Hx     Uterine cancer Neg Hx     Colon cancer Neg Hx     Melanoma Neg Hx     Malig Hyperthermia Neg Hx      Social History     Socioeconomic History    Marital status:      Spouse name: SONIA    Number of children: 2   Tobacco Use    Smoking status: Never    Smokeless tobacco: Never   Vaping Use    Vaping Use: Never used   Substance and Sexual Activity    Alcohol use: No    Drug use: No    Sexual activity: Yes     Partners: Male     Birth control/protection: Vaginal contraceptive ring, Inserts     Comment: nuva ring       Objective  Physical Exam  Constitutional:       Appearance: Normal appearance. She is well-developed. She is not toxic-appearing.   Eyes:      General: No scleral icterus.  Pulmonary:      Effort: Pulmonary effort is normal. No respiratory distress.   Abdominal:      Palpations: Abdomen is soft.      Tenderness: There is no abdominal tenderness.   Skin:     General: Skin is warm and dry.   Neurological:      Mental Status: She is alert and oriented to person, place, and time.   Psychiatric:         Behavior: Behavior normal.         Thought Content: Thought content normal.         Judgment: Judgment normal.       Class 3 Severe Obesity (BMI >=40). Obesity-related health conditions include the following: none. Obesity is improving with lifestyle modifications. BMI is is above average; BMI management plan is completed. We discussed low calorie, low carb based diet program, portion control, and increasing exercise.      Assessment & Plan    1.  Sigmoid colon cancer: The patient has long segment sigmoid colon cancer with no evidence of  metastatic disease on CT scan of the abdomen and pelvis.  This was discussed with her.  She will be scheduled for a da Kat robot-assisted laparoscopic sigmoid colon resection.  He understands that a diverting loop ileostomy may be necessary depending on operative findings and location of the anastomosis.  The patient understands the indications, alternatives, risks, and benefits of the procedure and wishes to proceed.

## 2024-01-29 ENCOUNTER — TELEPHONE (OUTPATIENT)
Dept: SURGERY | Facility: CLINIC | Age: 39
End: 2024-01-29
Payer: COMMERCIAL

## 2024-01-29 NOTE — TELEPHONE ENCOUNTER
The patient was called on telephone to return her call to the office.  She is concerned about having sensitive skin and was afraid this would be an issue if an ostomy is necessary.  It was explained that the ostomy nurse can help manage any skin issues that may be present.

## 2024-02-02 ENCOUNTER — PRE-ADMISSION TESTING (OUTPATIENT)
Dept: PREADMISSION TESTING | Facility: HOSPITAL | Age: 39
End: 2024-02-02
Payer: COMMERCIAL

## 2024-02-02 VITALS
TEMPERATURE: 97.1 F | HEIGHT: 62 IN | HEART RATE: 95 BPM | SYSTOLIC BLOOD PRESSURE: 137 MMHG | RESPIRATION RATE: 16 BRPM | BODY MASS INDEX: 44.53 KG/M2 | OXYGEN SATURATION: 100 % | WEIGHT: 242 LBS | DIASTOLIC BLOOD PRESSURE: 81 MMHG

## 2024-02-02 LAB
DEPRECATED RDW RBC AUTO: 41.5 FL (ref 37–54)
ERYTHROCYTE [DISTWIDTH] IN BLOOD BY AUTOMATED COUNT: 12.9 % (ref 12.3–15.4)
HCT VFR BLD AUTO: 37.6 % (ref 34–46.6)
HGB BLD-MCNC: 12.3 G/DL (ref 12–15.9)
MCH RBC QN AUTO: 28.9 PG (ref 26.6–33)
MCHC RBC AUTO-ENTMCNC: 32.7 G/DL (ref 31.5–35.7)
MCV RBC AUTO: 88.5 FL (ref 79–97)
PLATELET # BLD AUTO: 230 10*3/MM3 (ref 140–450)
PMV BLD AUTO: 9.4 FL (ref 6–12)
RBC # BLD AUTO: 4.25 10*6/MM3 (ref 3.77–5.28)
WBC NRBC COR # BLD AUTO: 7.05 10*3/MM3 (ref 3.4–10.8)

## 2024-02-02 PROCEDURE — 85027 COMPLETE CBC AUTOMATED: CPT

## 2024-02-02 PROCEDURE — 36415 COLL VENOUS BLD VENIPUNCTURE: CPT

## 2024-02-02 NOTE — DISCHARGE INSTRUCTIONS
Arrive to hospital on your day of surgery at 530AM      Take the following medications the morning of surgery:  NONE      If you are on prescription narcotic pain medication to control your pain you may also take that medication the morning of surgery.    General Instructions:    Follow your surgeons instructions regarding when to stop solid foods and when to stop liquids.   Verify with your surgeon if you are to complete a bowel prep and when to do so.  Patients who avoid smoking, chewing tobacco and alcohol for 4 weeks prior to surgery have a reduced risk of post-operative complications.  Quit smoking as many days before surgery as you can.  Do not smoke, use chewing tobacco or drink alcohol the day of surgery.   If applicable bring your C-PAP/ BI-PAP machine in with you to preop day of surgery.  Bring any papers given to you in the doctor’s office.  Wear clean comfortable clothes.  Do not wear contact lenses, false eyelashes or make-up.  Bring a case for your glasses.   Bring crutches or walker if applicable.  Remove all piercings.  Leave jewelry and any other valuables at home.  Hair extensions with metal clips must be removed prior to surgery.  The Pre-Admission Testing nurse will instruct you to bring medications if unable to obtain an accurate list in Pre-Admission Testing.        If you were given a blood bank ID arm band remember to bring it with you the day of surgery.    Preventing a Surgical Site Infection:  For 2 to 3 days before surgery, avoid shaving with a razor because the razor can irritate skin and make it easier to develop an infection.    Any areas of open skin can increase the risk of a post-operative wound infection by allowing bacteria to enter and travel throughout the body.  Notify your surgeon if you have any skin wounds / rashes even if it is not near the expected surgical site.  The area will need assessed to determine if surgery should be delayed until it is healed.  The night prior to  surgery shower using a fresh bar of anti-bacterial soap (such as Dial) and clean washcloth.  Sleep in a clean bed with clean clothing.  Do not allow pets to sleep with you.  Shower on the morning of surgery using a fresh bar of anti-bacterial soap (such as Dial) and clean washcloth.  Dry with a clean towel and dress in clean clothing.  Ask your surgeon if you will be receiving antibiotics prior to surgery.  Make sure you, your family, and all healthcare providers clean their hands with soap and water or an alcohol based hand  before caring for you or your wound.    Day of surgery:  Your arrival time is approximately two hours before your scheduled surgery time.  Upon arrival, a Pre-op nurse and Anesthesiologist will review your health history, obtain vital signs, and answer questions you may have.  The only belongings needed at this time will be a list of your home medications and if applicable your C-PAP/BI-PAP machine.  A Pre-op nurse will start an IV and you may receive medication in preparation for surgery, including something to help you relax.     Please be aware that surgery does come with discomfort.  We want to make every effort to control your discomfort so please discuss any uncontrolled symptoms with your nurse.   Your doctor will most likely have prescribed pain medications.      If you are going home after surgery you will receive individualized written care instructions before being discharged.  A responsible adult must drive you to and from the hospital on the day of your surgery and stay with you for 24 hours.  Discharge prescriptions can be filled by the hospital pharmacy during regular pharmacy hours.  If you are having surgery late in the day/evening your prescription may be e-prescribed to your pharmacy.  Please verify your pharmacy hours or chose a 24 hour pharmacy to avoid not having access to your prescription because your pharmacy has closed for the day.    If you are staying  overnight following surgery, you will be transported to your hospital room following the recovery period.   has all private rooms.    If you have any questions please call Pre-Admission Testing at [phone].  Deductibles and co-payments are collected on the day of service. Please be prepared to pay the required co-pay, deductible or deposit on the day of service as defined by your plan.    Call your surgeon immediately if you experience any of the following symptoms:  Sore Throat  Shortness of Breath or difficulty breathing  Cough  Chills  Body soreness or muscle pain  Headache  Fever  New loss of taste or smell  Do not arrive for your surgery ill.  Your procedure will need to be rescheduled to another time.  You will need to call your physician before the day of surgery to avoid any unnecessary exposure to hospital staff as well as other patients.            CHLORHEXIDINE CLOTH INSTRUCTIONS  The morning of surgery follow these instructions using the Chlorhexidine cloths you've been given.  These steps reduce bacteria on the body.  Do not use the cloths near your eyes, ears mouth, genitalia or on open wounds.  Throw the cloths away after use but do not try to flush them down a toilet.      Open and remove one cloth at a time from the package.    Leave the cloth unfolded and begin the bathing.  Massage the skin with the cloths using gentle pressure to remove bacteria.  Do not scrub harshly.   Follow the steps below with one 2% CHG cloth per area (6 total cloths).  One cloth for neck, shoulders and chest.  One cloth for both arms, hands, fingers and underarms (do underarms last).  One cloth for the abdomen followed by groin.  One cloth for right leg and foot including between the toes.  One cloth for left leg and foot including between the toes.  The last cloth is to be used for the back of the neck, back and buttocks.    Allow the CHG to air dry 3 minutes on the skin which will give it  time to work and decrease the chance of irritation.  The skin may feel sticky until it is dry.  Do not rinse with water or any other liquid or you will lose the beneficial effects of the CHG.  If mild skin irritation occurs, do rinse the skin to remove the CHG.  Report this to the nurse at time of admission.  Do not apply lotions, creams, ointments, deodorants or perfumes after using the clothes. Dress in clean clothes before coming to the hospital.

## 2024-02-09 ENCOUNTER — HOSPITAL ENCOUNTER (INPATIENT)
Facility: HOSPITAL | Age: 39
LOS: 3 days | Discharge: HOME | DRG: 330 | End: 2024-02-12
Attending: SURGERY | Admitting: SURGERY
Payer: COMMERCIAL

## 2024-02-09 ENCOUNTER — ANESTHESIA EVENT (OUTPATIENT)
Dept: PERIOP | Facility: HOSPITAL | Age: 39
End: 2024-02-09
Payer: COMMERCIAL

## 2024-02-09 ENCOUNTER — ANESTHESIA (OUTPATIENT)
Dept: PERIOP | Facility: HOSPITAL | Age: 39
End: 2024-02-09
Payer: COMMERCIAL

## 2024-02-09 DIAGNOSIS — C18.7 MALIGNANT NEOPLASM OF SIGMOID COLON: ICD-10-CM

## 2024-02-09 LAB
ABO GROUP BLD: NORMAL
B-HCG UR QL: NEGATIVE
BLD GP AB SCN SERPL QL: NEGATIVE
EXPIRATION DATE: NORMAL
GLUCOSE BLDC GLUCOMTR-MCNC: 99 MG/DL (ref 70–130)
INTERNAL NEGATIVE CONTROL: NORMAL
INTERNAL POSITIVE CONTROL: NORMAL
Lab: NORMAL
RH BLD: NEGATIVE
T&S EXPIRATION DATE: NORMAL

## 2024-02-09 PROCEDURE — 25010000002 FENTANYL CITRATE (PF) 50 MCG/ML SOLUTION: Performed by: NURSE ANESTHETIST, CERTIFIED REGISTERED

## 2024-02-09 PROCEDURE — 25010000002 CEFOXITIN PER 1 G: Performed by: NURSE ANESTHETIST, CERTIFIED REGISTERED

## 2024-02-09 PROCEDURE — 86850 RBC ANTIBODY SCREEN: CPT | Performed by: SURGERY

## 2024-02-09 PROCEDURE — 44204 LAPARO PARTIAL COLECTOMY: CPT | Performed by: SURGERY

## 2024-02-09 PROCEDURE — 25010000002 MIDAZOLAM PER 1 MG: Performed by: ANESTHESIOLOGY

## 2024-02-09 PROCEDURE — 25810000003 LACTATED RINGERS PER 1000 ML: Performed by: SURGERY

## 2024-02-09 PROCEDURE — 88305 TISSUE EXAM BY PATHOLOGIST: CPT | Performed by: SURGERY

## 2024-02-09 PROCEDURE — 25010000002 ONDANSETRON PER 1 MG: Performed by: NURSE ANESTHETIST, CERTIFIED REGISTERED

## 2024-02-09 PROCEDURE — 44204 LAPARO PARTIAL COLECTOMY: CPT | Performed by: SPECIALIST/TECHNOLOGIST, OTHER

## 2024-02-09 PROCEDURE — 25010000002 PROPOFOL 200 MG/20ML EMULSION: Performed by: NURSE ANESTHETIST, CERTIFIED REGISTERED

## 2024-02-09 PROCEDURE — 25810000003 LACTATED RINGERS PER 1000 ML: Performed by: ANESTHESIOLOGY

## 2024-02-09 PROCEDURE — 25010000002 HYDROMORPHONE PER 4 MG: Performed by: NURSE ANESTHETIST, CERTIFIED REGISTERED

## 2024-02-09 PROCEDURE — 25810000003 SODIUM CHLORIDE PER 500 ML: Performed by: SURGERY

## 2024-02-09 PROCEDURE — 86901 BLOOD TYPING SEROLOGIC RH(D): CPT | Performed by: SURGERY

## 2024-02-09 PROCEDURE — 82948 REAGENT STRIP/BLOOD GLUCOSE: CPT

## 2024-02-09 PROCEDURE — 8E0W4CZ ROBOTIC ASSISTED PROCEDURE OF TRUNK REGION, PERCUTANEOUS ENDOSCOPIC APPROACH: ICD-10-PCS | Performed by: SURGERY

## 2024-02-09 PROCEDURE — 88309 TISSUE EXAM BY PATHOLOGIST: CPT | Performed by: SURGERY

## 2024-02-09 PROCEDURE — 25010000002 KETOROLAC TROMETHAMINE PER 15 MG: Performed by: NURSE ANESTHETIST, CERTIFIED REGISTERED

## 2024-02-09 PROCEDURE — 0DTN4ZZ RESECTION OF SIGMOID COLON, PERCUTANEOUS ENDOSCOPIC APPROACH: ICD-10-PCS | Performed by: SURGERY

## 2024-02-09 PROCEDURE — 25010000002 DEXAMETHASONE SODIUM PHOSPHATE 20 MG/5ML SOLUTION: Performed by: NURSE ANESTHETIST, CERTIFIED REGISTERED

## 2024-02-09 PROCEDURE — 25010000002 HYDROMORPHONE PER 4 MG: Performed by: SURGERY

## 2024-02-09 PROCEDURE — 25010000002 CEFOXITIN PER 1 G: Performed by: SURGERY

## 2024-02-09 PROCEDURE — 25010000002 SUGAMMADEX 200 MG/2ML SOLUTION: Performed by: NURSE ANESTHETIST, CERTIFIED REGISTERED

## 2024-02-09 PROCEDURE — 25010000002 FENTANYL CITRATE (PF) 50 MCG/ML SOLUTION: Performed by: ANESTHESIOLOGY

## 2024-02-09 PROCEDURE — 25010000002 HYDROMORPHONE 1 MG/ML SOLUTION: Performed by: NURSE ANESTHETIST, CERTIFIED REGISTERED

## 2024-02-09 PROCEDURE — 86900 BLOOD TYPING SEROLOGIC ABO: CPT | Performed by: SURGERY

## 2024-02-09 DEVICE — IMPLANTABLE DEVICE: Type: IMPLANTABLE DEVICE | Site: ABDOMEN | Status: FUNCTIONAL

## 2024-02-09 DEVICE — CARTRDG CLIP LIGAT VASQCLIP 3TO10MM MD/LG STRL: Type: IMPLANTABLE DEVICE | Site: ABDOMEN | Status: FUNCTIONAL

## 2024-02-09 RX ORDER — DROPERIDOL 2.5 MG/ML
0.62 INJECTION, SOLUTION INTRAMUSCULAR; INTRAVENOUS
Status: DISCONTINUED | OUTPATIENT
Start: 2024-02-09 | End: 2024-02-09 | Stop reason: HOSPADM

## 2024-02-09 RX ORDER — CEFOXITIN 2 G/1
INJECTION, POWDER, FOR SOLUTION INTRAVENOUS AS NEEDED
Status: DISCONTINUED | OUTPATIENT
Start: 2024-02-09 | End: 2024-02-09 | Stop reason: SURG

## 2024-02-09 RX ORDER — SODIUM CHLORIDE, SODIUM LACTATE, POTASSIUM CHLORIDE, CALCIUM CHLORIDE 600; 310; 30; 20 MG/100ML; MG/100ML; MG/100ML; MG/100ML
9 INJECTION, SOLUTION INTRAVENOUS CONTINUOUS
Status: DISCONTINUED | OUTPATIENT
Start: 2024-02-09 | End: 2024-02-09

## 2024-02-09 RX ORDER — ONDANSETRON 2 MG/ML
INJECTION INTRAMUSCULAR; INTRAVENOUS AS NEEDED
Status: DISCONTINUED | OUTPATIENT
Start: 2024-02-09 | End: 2024-02-09 | Stop reason: SURG

## 2024-02-09 RX ORDER — FLUMAZENIL 0.1 MG/ML
0.2 INJECTION INTRAVENOUS AS NEEDED
Status: DISCONTINUED | OUTPATIENT
Start: 2024-02-09 | End: 2024-02-09 | Stop reason: HOSPADM

## 2024-02-09 RX ORDER — SODIUM CHLORIDE 0.9 % (FLUSH) 0.9 %
10 SYRINGE (ML) INJECTION AS NEEDED
Status: DISCONTINUED | OUTPATIENT
Start: 2024-02-09 | End: 2024-02-09 | Stop reason: HOSPADM

## 2024-02-09 RX ORDER — HYDROMORPHONE HYDROCHLORIDE 1 MG/ML
0.5 INJECTION, SOLUTION INTRAMUSCULAR; INTRAVENOUS; SUBCUTANEOUS
Status: DISCONTINUED | OUTPATIENT
Start: 2024-02-09 | End: 2024-02-09 | Stop reason: HOSPADM

## 2024-02-09 RX ORDER — NALOXONE HCL 0.4 MG/ML
0.1 VIAL (ML) INJECTION
Status: DISCONTINUED | OUTPATIENT
Start: 2024-02-09 | End: 2024-02-12 | Stop reason: HOSPADM

## 2024-02-09 RX ORDER — ENOXAPARIN SODIUM 100 MG/ML
40 INJECTION SUBCUTANEOUS DAILY
Status: DISCONTINUED | OUTPATIENT
Start: 2024-02-10 | End: 2024-02-10 | Stop reason: DRUGHIGH

## 2024-02-09 RX ORDER — FENTANYL CITRATE 50 UG/ML
50 INJECTION, SOLUTION INTRAMUSCULAR; INTRAVENOUS ONCE AS NEEDED
Status: COMPLETED | OUTPATIENT
Start: 2024-02-09 | End: 2024-02-09

## 2024-02-09 RX ORDER — ROCURONIUM BROMIDE 10 MG/ML
INJECTION, SOLUTION INTRAVENOUS AS NEEDED
Status: DISCONTINUED | OUTPATIENT
Start: 2024-02-09 | End: 2024-02-09 | Stop reason: SURG

## 2024-02-09 RX ORDER — LABETALOL HYDROCHLORIDE 5 MG/ML
5 INJECTION, SOLUTION INTRAVENOUS
Status: DISCONTINUED | OUTPATIENT
Start: 2024-02-09 | End: 2024-02-09 | Stop reason: HOSPADM

## 2024-02-09 RX ORDER — PROMETHAZINE HYDROCHLORIDE 25 MG/1
25 SUPPOSITORY RECTAL ONCE AS NEEDED
Status: DISCONTINUED | OUTPATIENT
Start: 2024-02-09 | End: 2024-02-09 | Stop reason: HOSPADM

## 2024-02-09 RX ORDER — ONDANSETRON 4 MG/1
4 TABLET, ORALLY DISINTEGRATING ORAL EVERY 6 HOURS PRN
Status: DISCONTINUED | OUTPATIENT
Start: 2024-02-09 | End: 2024-02-12 | Stop reason: HOSPADM

## 2024-02-09 RX ORDER — OXYCODONE AND ACETAMINOPHEN 7.5; 325 MG/1; MG/1
1 TABLET ORAL EVERY 4 HOURS PRN
Status: DISCONTINUED | OUTPATIENT
Start: 2024-02-09 | End: 2024-02-09 | Stop reason: HOSPADM

## 2024-02-09 RX ORDER — PROPOFOL 10 MG/ML
INJECTION, EMULSION INTRAVENOUS AS NEEDED
Status: DISCONTINUED | OUTPATIENT
Start: 2024-02-09 | End: 2024-02-09 | Stop reason: SURG

## 2024-02-09 RX ORDER — SODIUM CHLORIDE, SODIUM LACTATE, POTASSIUM CHLORIDE, CALCIUM CHLORIDE 600; 310; 30; 20 MG/100ML; MG/100ML; MG/100ML; MG/100ML
50 INJECTION, SOLUTION INTRAVENOUS CONTINUOUS
Status: DISCONTINUED | OUTPATIENT
Start: 2024-02-09 | End: 2024-02-10

## 2024-02-09 RX ORDER — IPRATROPIUM BROMIDE AND ALBUTEROL SULFATE 2.5; .5 MG/3ML; MG/3ML
3 SOLUTION RESPIRATORY (INHALATION) ONCE AS NEEDED
Status: DISCONTINUED | OUTPATIENT
Start: 2024-02-09 | End: 2024-02-09 | Stop reason: HOSPADM

## 2024-02-09 RX ORDER — SODIUM CHLORIDE 0.9 % (FLUSH) 0.9 %
3 SYRINGE (ML) INJECTION EVERY 12 HOURS SCHEDULED
Status: DISCONTINUED | OUTPATIENT
Start: 2024-02-09 | End: 2024-02-09 | Stop reason: HOSPADM

## 2024-02-09 RX ORDER — DIPHENHYDRAMINE HYDROCHLORIDE 50 MG/ML
12.5 INJECTION INTRAMUSCULAR; INTRAVENOUS
Status: DISCONTINUED | OUTPATIENT
Start: 2024-02-09 | End: 2024-02-09 | Stop reason: HOSPADM

## 2024-02-09 RX ORDER — OXYCODONE HYDROCHLORIDE AND ACETAMINOPHEN 5; 325 MG/1; MG/1
1 TABLET ORAL EVERY 4 HOURS PRN
Status: DISCONTINUED | OUTPATIENT
Start: 2024-02-09 | End: 2024-02-12 | Stop reason: HOSPADM

## 2024-02-09 RX ORDER — MAGNESIUM HYDROXIDE 1200 MG/15ML
LIQUID ORAL AS NEEDED
Status: DISCONTINUED | OUTPATIENT
Start: 2024-02-09 | End: 2024-02-09 | Stop reason: HOSPADM

## 2024-02-09 RX ORDER — ONDANSETRON 2 MG/ML
4 INJECTION INTRAMUSCULAR; INTRAVENOUS EVERY 6 HOURS PRN
Status: DISCONTINUED | OUTPATIENT
Start: 2024-02-09 | End: 2024-02-12 | Stop reason: HOSPADM

## 2024-02-09 RX ORDER — HYDRALAZINE HYDROCHLORIDE 20 MG/ML
5 INJECTION INTRAMUSCULAR; INTRAVENOUS
Status: DISCONTINUED | OUTPATIENT
Start: 2024-02-09 | End: 2024-02-09 | Stop reason: HOSPADM

## 2024-02-09 RX ORDER — FENTANYL CITRATE 50 UG/ML
50 INJECTION, SOLUTION INTRAMUSCULAR; INTRAVENOUS
Status: DISCONTINUED | OUTPATIENT
Start: 2024-02-09 | End: 2024-02-09 | Stop reason: HOSPADM

## 2024-02-09 RX ORDER — NALOXONE HCL 0.4 MG/ML
0.2 VIAL (ML) INJECTION AS NEEDED
Status: DISCONTINUED | OUTPATIENT
Start: 2024-02-09 | End: 2024-02-09 | Stop reason: HOSPADM

## 2024-02-09 RX ORDER — MIDAZOLAM HYDROCHLORIDE 1 MG/ML
1 INJECTION INTRAMUSCULAR; INTRAVENOUS
Status: DISCONTINUED | OUTPATIENT
Start: 2024-02-09 | End: 2024-02-09 | Stop reason: HOSPADM

## 2024-02-09 RX ORDER — PROMETHAZINE HYDROCHLORIDE 25 MG/1
25 TABLET ORAL ONCE AS NEEDED
Status: DISCONTINUED | OUTPATIENT
Start: 2024-02-09 | End: 2024-02-09 | Stop reason: HOSPADM

## 2024-02-09 RX ORDER — KETOROLAC TROMETHAMINE 30 MG/ML
30 INJECTION, SOLUTION INTRAMUSCULAR; INTRAVENOUS ONCE
Status: COMPLETED | OUTPATIENT
Start: 2024-02-09 | End: 2024-02-09

## 2024-02-09 RX ORDER — LIDOCAINE HYDROCHLORIDE 10 MG/ML
0.5 INJECTION, SOLUTION INFILTRATION; PERINEURAL ONCE AS NEEDED
Status: DISCONTINUED | OUTPATIENT
Start: 2024-02-09 | End: 2024-02-09 | Stop reason: HOSPADM

## 2024-02-09 RX ORDER — DEXAMETHASONE SODIUM PHOSPHATE 4 MG/ML
INJECTION, SOLUTION INTRA-ARTICULAR; INTRALESIONAL; INTRAMUSCULAR; INTRAVENOUS; SOFT TISSUE AS NEEDED
Status: DISCONTINUED | OUTPATIENT
Start: 2024-02-09 | End: 2024-02-09 | Stop reason: SURG

## 2024-02-09 RX ORDER — SODIUM CHLORIDE 0.9 % (FLUSH) 0.9 %
3-10 SYRINGE (ML) INJECTION AS NEEDED
Status: DISCONTINUED | OUTPATIENT
Start: 2024-02-09 | End: 2024-02-09 | Stop reason: HOSPADM

## 2024-02-09 RX ORDER — HYDROCODONE BITARTRATE AND ACETAMINOPHEN 5; 325 MG/1; MG/1
1 TABLET ORAL ONCE AS NEEDED
Status: DISCONTINUED | OUTPATIENT
Start: 2024-02-09 | End: 2024-02-09 | Stop reason: HOSPADM

## 2024-02-09 RX ORDER — SODIUM CHLORIDE 9 MG/ML
INJECTION, SOLUTION INTRAVENOUS AS NEEDED
Status: DISCONTINUED | OUTPATIENT
Start: 2024-02-09 | End: 2024-02-09 | Stop reason: HOSPADM

## 2024-02-09 RX ORDER — HYDROMORPHONE HYDROCHLORIDE 1 MG/ML
0.5 INJECTION, SOLUTION INTRAMUSCULAR; INTRAVENOUS; SUBCUTANEOUS
Status: DISCONTINUED | OUTPATIENT
Start: 2024-02-09 | End: 2024-02-12 | Stop reason: HOSPADM

## 2024-02-09 RX ORDER — ONDANSETRON 2 MG/ML
4 INJECTION INTRAMUSCULAR; INTRAVENOUS ONCE AS NEEDED
Status: DISCONTINUED | OUTPATIENT
Start: 2024-02-09 | End: 2024-02-09 | Stop reason: HOSPADM

## 2024-02-09 RX ORDER — LIDOCAINE HYDROCHLORIDE 20 MG/ML
INJECTION, SOLUTION INFILTRATION; PERINEURAL AS NEEDED
Status: DISCONTINUED | OUTPATIENT
Start: 2024-02-09 | End: 2024-02-09 | Stop reason: SURG

## 2024-02-09 RX ORDER — SODIUM CHLORIDE, SODIUM LACTATE, POTASSIUM CHLORIDE, CALCIUM CHLORIDE 600; 310; 30; 20 MG/100ML; MG/100ML; MG/100ML; MG/100ML
1000 INJECTION, SOLUTION INTRAVENOUS CONTINUOUS
Status: DISCONTINUED | OUTPATIENT
Start: 2024-02-09 | End: 2024-02-09

## 2024-02-09 RX ORDER — EPHEDRINE SULFATE 50 MG/ML
5 INJECTION, SOLUTION INTRAVENOUS ONCE AS NEEDED
Status: DISCONTINUED | OUTPATIENT
Start: 2024-02-09 | End: 2024-02-09 | Stop reason: HOSPADM

## 2024-02-09 RX ORDER — FAMOTIDINE 10 MG/ML
20 INJECTION, SOLUTION INTRAVENOUS ONCE
Status: COMPLETED | OUTPATIENT
Start: 2024-02-09 | End: 2024-02-09

## 2024-02-09 RX ORDER — BUPIVACAINE HYDROCHLORIDE AND EPINEPHRINE 2.5; 5 MG/ML; UG/ML
INJECTION, SOLUTION EPIDURAL; INFILTRATION; INTRACAUDAL; PERINEURAL AS NEEDED
Status: DISCONTINUED | OUTPATIENT
Start: 2024-02-09 | End: 2024-02-09 | Stop reason: HOSPADM

## 2024-02-09 RX ADMIN — ROCURONIUM BROMIDE 20 MG: 10 INJECTION, SOLUTION INTRAVENOUS at 08:45

## 2024-02-09 RX ADMIN — HYDROMORPHONE HYDROCHLORIDE 0.5 MG: 1 INJECTION, SOLUTION INTRAMUSCULAR; INTRAVENOUS; SUBCUTANEOUS at 08:24

## 2024-02-09 RX ADMIN — CEFOXITIN SODIUM 2 G: 2 POWDER, FOR SOLUTION INTRAVENOUS at 07:31

## 2024-02-09 RX ADMIN — DEXAMETHASONE SODIUM PHOSPHATE 4 MG: 4 INJECTION, SOLUTION INTRAMUSCULAR; INTRAVENOUS at 07:40

## 2024-02-09 RX ADMIN — ONDANSETRON 4 MG: 2 INJECTION INTRAMUSCULAR; INTRAVENOUS at 07:40

## 2024-02-09 RX ADMIN — Medication 3 ML: at 07:31

## 2024-02-09 RX ADMIN — ROCURONIUM BROMIDE 20 MG: 10 INJECTION, SOLUTION INTRAVENOUS at 10:40

## 2024-02-09 RX ADMIN — FAMOTIDINE 20 MG: 10 INJECTION INTRAVENOUS at 07:27

## 2024-02-09 RX ADMIN — CEFOXITIN SODIUM 2 G: 2 POWDER, FOR SOLUTION INTRAVENOUS at 09:31

## 2024-02-09 RX ADMIN — ROCURONIUM BROMIDE 20 MG: 10 INJECTION, SOLUTION INTRAVENOUS at 08:17

## 2024-02-09 RX ADMIN — LIDOCAINE HYDROCHLORIDE 100 MG: 20 INJECTION, SOLUTION INFILTRATION; PERINEURAL at 07:42

## 2024-02-09 RX ADMIN — ROCURONIUM BROMIDE 30 MG: 10 INJECTION, SOLUTION INTRAVENOUS at 07:42

## 2024-02-09 RX ADMIN — KETOROLAC TROMETHAMINE 30 MG: 30 INJECTION, SOLUTION INTRAMUSCULAR; INTRAVENOUS at 12:28

## 2024-02-09 RX ADMIN — HYDROMORPHONE HYDROCHLORIDE 0.5 MG: 1 INJECTION, SOLUTION INTRAMUSCULAR; INTRAVENOUS; SUBCUTANEOUS at 13:34

## 2024-02-09 RX ADMIN — SODIUM CHLORIDE, POTASSIUM CHLORIDE, SODIUM LACTATE AND CALCIUM CHLORIDE 1000 ML: 600; 310; 30; 20 INJECTION, SOLUTION INTRAVENOUS at 07:28

## 2024-02-09 RX ADMIN — SODIUM CHLORIDE, POTASSIUM CHLORIDE, SODIUM LACTATE AND CALCIUM CHLORIDE 9 ML/HR: 600; 310; 30; 20 INJECTION, SOLUTION INTRAVENOUS at 07:29

## 2024-02-09 RX ADMIN — FENTANYL CITRATE 50 MCG: 50 INJECTION, SOLUTION INTRAMUSCULAR; INTRAVENOUS at 12:46

## 2024-02-09 RX ADMIN — ROCURONIUM BROMIDE 10 MG: 10 INJECTION, SOLUTION INTRAVENOUS at 09:43

## 2024-02-09 RX ADMIN — PROPOFOL 120 MG: 10 INJECTION, EMULSION INTRAVENOUS at 07:42

## 2024-02-09 RX ADMIN — SUGAMMADEX 100 MG: 100 INJECTION, SOLUTION INTRAVENOUS at 11:34

## 2024-02-09 RX ADMIN — SODIUM CHLORIDE, POTASSIUM CHLORIDE, SODIUM LACTATE AND CALCIUM CHLORIDE: 600; 310; 30; 20 INJECTION, SOLUTION INTRAVENOUS at 10:45

## 2024-02-09 RX ADMIN — HYDROMORPHONE HYDROCHLORIDE 0.5 MG: 1 INJECTION, SOLUTION INTRAMUSCULAR; INTRAVENOUS; SUBCUTANEOUS at 18:06

## 2024-02-09 RX ADMIN — FENTANYL CITRATE 100 MCG: 50 INJECTION, SOLUTION INTRAMUSCULAR; INTRAVENOUS at 07:42

## 2024-02-09 RX ADMIN — SODIUM CHLORIDE, POTASSIUM CHLORIDE, SODIUM LACTATE AND CALCIUM CHLORIDE 50 ML/HR: 600; 310; 30; 20 INJECTION, SOLUTION INTRAVENOUS at 16:12

## 2024-02-09 RX ADMIN — HYDROMORPHONE HYDROCHLORIDE 0.5 MG: 1 INJECTION, SOLUTION INTRAMUSCULAR; INTRAVENOUS; SUBCUTANEOUS at 09:57

## 2024-02-09 RX ADMIN — MIDAZOLAM 1 MG: 1 INJECTION INTRAMUSCULAR; INTRAVENOUS at 07:26

## 2024-02-09 RX ADMIN — HYDROMORPHONE HYDROCHLORIDE 0.5 MG: 1 INJECTION, SOLUTION INTRAMUSCULAR; INTRAVENOUS; SUBCUTANEOUS at 12:38

## 2024-02-09 NOTE — ANESTHESIA POSTPROCEDURE EVALUATION
"Patient: Agustina Cisneros    Procedure Summary       Date: 02/09/24 Room / Location: [facility] OR  / [facility] MAIN OR    Anesthesia Start: 0737 Anesthesia Stop: 1204    Procedure: DaVinci robot-assisted laparoscopic sigmoid colon resection (Abdomen) Diagnosis:       Malignant neoplasm of sigmoid colon      (Malignant neoplasm of sigmoid colon [C18.7])    Surgeons: Reji Mcdowell Jr., MD Provider: Dimas Ortega MD    Anesthesia Type: general ASA Status: 3            Anesthesia Type: general    Vitals  Vitals Value Taken Time   /83 02/09/24 1400   Temp 36.8 °C (98.3 °F) 02/09/24 1202   Pulse 94 02/09/24 1401   Resp 16 02/09/24 1400   SpO2 92 % 02/09/24 1401   Vitals shown include unfiled device data.        Post Anesthesia Care and Evaluation    Patient location during evaluation: bedside  Patient participation: complete - patient participated  Level of consciousness: awake and alert  Pain management: adequate    Airway patency: patent  Anesthetic complications: No anesthetic complications    Cardiovascular status: acceptable  Respiratory status: acceptable  Hydration status: acceptable    Comments: /73 (BP Location: Left arm, Patient Position: Lying)   Pulse 87   Temp 36.4 °C (97.6 °F) (Oral)   Resp 16   Ht 157.5 cm (62\")   Wt 110 kg (242 lb)   LMP 01/30/2024 (Within Days)   SpO2 96%   BMI 44.26 kg/m²     "

## 2024-02-09 NOTE — ANESTHESIA PREPROCEDURE EVALUATION
Anesthesia Evaluation     Patient summary reviewed and Nursing notes reviewed   NPO Solid Status: > 8 hours  NPO Liquid Status: > 2 hours           Airway   Mallampati: II  TM distance: >3 FB  Neck ROM: full  No difficulty expected  Dental      Pulmonary - negative pulmonary ROS   Cardiovascular - negative cardio ROS  Exercise tolerance: good (4-7 METS)        Neuro/Psych  (+) headaches  GI/Hepatic/Renal/Endo    (+) morbid obesity, GERD, GI bleeding lower resolved  (-)  obesity    Musculoskeletal (-) negative ROS    Abdominal   (+) obese   Substance History - negative use     OB/GYN negative ob/gyn ROS         Other                        Anesthesia Plan    ASA 3     general     intravenous induction     Anesthetic plan, risks, benefits, and alternatives have been provided, discussed and informed consent has been obtained with: patient and spouse/significant other.  Pre-procedure education provided  Use of blood products discussed with patient  Consented to blood products.        CODE STATUS:

## 2024-02-09 NOTE — OP NOTE
Surgeon: Reji Mcdowell Jr., M.D.    Assistant: Karely Cintron CSA    An assistant was necessary and provided valuable retraction and exposure, as well as camera holding, instrument exchanges, suction and irrigation, bowel manipulation, managing the rectal portion of the stapled anastomosis, and wound closure.  An assistant was crucial for the success of the case especially while I was at the console.    Pre-Operative Diagnosis:     Malignant neoplasm of sigmoid colon [C18.7]    Post-Operative Diagnosis:    Sigmoid colon cancer    Procedure Performed:     Da Kat robot assisted laparoscopic sigmoid colon resection    Indications:     The patient is a pleasant 38-year-old female who who underwent a colonoscopy for rectal bleeding and was found to have a long segment sigmoid colon cancer.  She presents for da Kat robot assisted laparoscopic sigmoid colon resection.  The patient understands the indications, alternatives, risks, and benefits of the procedure and wishes to proceed.     Procedure:     The patient was identified and taken to the operating room where she was placed in the supine position on the operative table.  She underwent general endotracheal anesthesia and was appropriate monitored throughout the case by the anesthesia personnel.  Her abdomen and perianal region was prepped and draped in the standard surgical fashion.  Each incision was infiltrated with 0.5% Marcaine with epinephrine prior to making the incision.  A right upper quadrant incision was made with a scalpel carried through the skin into the subcutaneous tissue.  A 5 mm Optiview port was then placed with laparoscopic guidance.  The abdomen was then insufflated with low pressures encountered initially.  The abdomen was then inspected with no concerning abnormalities present.  There is no free fluid, no evidence of peritoneal metastases, no evidence of liver metastases.  The sites were selected for robotic ports extending from the left upper  quadrant to the right lower quadrant.  Each site was created by making a skin incision, carried through the skin into the subcutaneous tissue, and inserting the port through the incision into the abdomen with direct visitation intra-abdominal using laparoscope.  There was an 8 mm port placed in the left upper quadrant followed by 2 separate 8 mm ports across the abdomen and then a 12 mm port placed in the right lower quadrant.  All the robotic ports.  The original 5 mm port was exchanged for an 8 mm assist port.  The patient was then placed in Trendelenburg, 25 degrees, and rotated with the left side up 8 degrees.  The omentum was elevated out of the pelvis and the area of the cancer could not be visualized well.  The robot was docked.  Once the robot was docked, the sigmoid colon was identified after moving small bowel out of the pelvis.  This allowed visualization of the cancer which involved the sigmoid colon and was flopped in the pelvis.  It was somewhat bulky but had no attachments to the sidewalls.  At the time of the colonoscopy, the area distal to the cancer had been tattooed and this tattooing was visualized just above the peritoneal reflection.  The sigmoid colon was partially mobilized by dividing lateral attachments along the white line of Toldt and this lateral mobilization extended up to mobilize the left colon.  This was all performed with cauterized scissors.  The splenic flexure was not taken down.  Attention was then turned to medial mobilization and the sigmoid colon was elevated.  The mesentery was divided near this sacral promontory using Bovie electrocautery.  This allowed access to the plane between the mesentery and the retroperitoneum.  This plane was established with Bovie electrocautery and blunt dissection.  The ureter was identified and care was taken to keep that in the retroperitoneum and outside of the mesentery which is being elevated.  The mobilization was carried superiorly to the  level of the KATHLEEN and the superior hemorrhoidal was identified coming off the KATHLEEN.  The mobilization was then carried down into the pelvis and total mesorectal mobilization was performed to a level just below the tattooing.  Once the mobilization was complete the superior hemorrhoidal was divided by surrounding it and then placing 2 Hem-o-silvano clips proximally and dividing distally with the vessel sealer.  The mesentery was then partially divided using the vessel sealer to establish the initial resection area.  Attention was then turned to the distal division and the mesorectum was divided to the wall of the proximal rectum.  This was just below the tattooing and, therefore, distal to the cancer.  Once the rectal wall was completely isolated, perfusion was checked using ICG and firefly technology.  The proximal rectum was then divided with the 45 mm robotic stapler using a blue load.  Attention was turned to the proximal division and an area in the left colon was selected for proximal division.  The left colon was further mobilized using cauterized scissors to make sure it easily descended into the pelvis.  The colon was then elevated at the site of proposed resection and the mesentery was divided at the previous marked area straight to the bowel wall using the vessel sealer.  The bowel wall was completely surrounded and isolated.  ICG firefly technology was again used to confirm perfusion.  This allowed certainty as to the site of division.  At this point, the stapler port was enlarged using Bovie electrocautery and a small Arvin wound protector was placed through this wound and a 28 mm anvil was placed into the abdomen.  A colotomy was created distal to the proximal resection site in the specimen.  The anvil was then inspected and an area was selected on the antimesenteric border of the left colon for the spike to exit.  A small colotomy was created in this area.  The anvil was then placed through the previously  established colotomy and the spike was brought out to the small colotomy just created.  The left colon was then divided with the 45 mm robotic stapler just distal to the anvil placement and an area that had been confirmed to be well-perfused by the firefly technology.  This was performed with a blue load.  The specimen was then moved into the right lower quadrant and the left colon was brought down to the pelvis and reached with no tension.  A standard EEA stapled anastomosis was then performed using the 28 mm Covidien stapler.  Both donuts were intact.  The pelvis was then filled with irrigation fluid and air was insufflated through the rectum with no leak noted.  The abdomen was copiously irrigated and hemostasis was noted be adequate.  No other abnormalities were noted.  The robot was undocked.  The specimen was removed through the Arvin wound retractor in the right lower quadrant incision and it was passed off for pathology.  The laparoscope was returned to the abdomen and the area was again irrigated and suctioned with hemostasis noted to be adequate.  All ports were removed and the abdomen was deflated.  The fascia of the extraction site in the right lower quadrant was closed with a 0 PDS suture in a running fashion.  All skin incisions were closed with a 4-0 Monocryl in a subcuticular fashion followed by Mastisol and Steri-Strips.  The sponge, needle, and instrument counts were correct at the end of the case.  The patient tolerated the procedure well and was transferred to the recovery room in stable condition.    Estimated Blood Loss:      minimal    Specimens:     Order Name Source Comment Collection Info Order Time   TYPE AND SCREEN   Collected By: Vinnie Huggins RN 2/9/2024  7:16 AM     Release to patient   Routine Release        TISSUE PATHOLOGY EXAM Large Intestine, Rectum  Collected By: Reji Mcdowell Jr., MD 2/9/2024 10:58 AM     Release to patient   Routine Release            Colon  Resection  Operation performed with curative intent Yes   Tumor Location (select all that apply) Sigmoid colon   Extent of colon and vascular resection  (select all that apply) Sigmoid resection - inferior mesenteric         Reji Mcdowell Jr., M.D.

## 2024-02-09 NOTE — ANESTHESIA PROCEDURE NOTES
Airway  Urgency: elective    Date/Time: 2/9/2024 7:48 AM  Airway not difficult    General Information and Staff    Patient location during procedure: OR    Indications and Patient Condition  Indications for airway management: airway protection    Preoxygenated: yes  MILS maintained throughout  Mask difficulty assessment: 1 - vent by mask    Final Airway Details  Final airway type: endotracheal airway      Successful airway: ETT  Cuffed: yes   Successful intubation technique: direct laryngoscopy  Endotracheal tube insertion site: oral  Blade: Adeline  Blade size: 3  ETT size (mm): 7.5  Cormack-Lehane Classification: grade I - full view of glottis  Placement verified by: chest auscultation   Measured from: lips  ETT/EBT  to lips (cm): 21  Number of attempts at approach: 2  Assessment: lips, teeth, and gum same as pre-op and atraumatic intubation

## 2024-02-10 LAB
ANION GAP SERPL CALCULATED.3IONS-SCNC: 9.1 MMOL/L (ref 5–15)
BASOPHILS # BLD AUTO: 0.03 10*3/MM3 (ref 0–0.2)
BASOPHILS NFR BLD AUTO: 0.3 % (ref 0–1.5)
BUN SERPL-MCNC: 10 MG/DL (ref 6–20)
BUN/CREAT SERPL: 12 (ref 7–25)
CALCIUM SPEC-SCNC: 8.3 MG/DL (ref 8.6–10.5)
CEA SERPL-MCNC: 0.78 NG/ML
CHLORIDE SERPL-SCNC: 108 MMOL/L (ref 98–107)
CO2 SERPL-SCNC: 21.9 MMOL/L (ref 22–29)
CREAT SERPL-MCNC: 0.83 MG/DL (ref 0.57–1)
DEPRECATED RDW RBC AUTO: 39.7 FL (ref 37–54)
EGFRCR SERPLBLD CKD-EPI 2021: 92.7 ML/MIN/1.73
EOSINOPHIL # BLD AUTO: 0.01 10*3/MM3 (ref 0–0.4)
EOSINOPHIL NFR BLD AUTO: 0.1 % (ref 0.3–6.2)
ERYTHROCYTE [DISTWIDTH] IN BLOOD BY AUTOMATED COUNT: 12.6 % (ref 12.3–15.4)
GLUCOSE SERPL-MCNC: 94 MG/DL (ref 65–99)
HCT VFR BLD AUTO: 34.1 % (ref 34–46.6)
HGB BLD-MCNC: 11.3 G/DL (ref 12–15.9)
IMM GRANULOCYTES # BLD AUTO: 0.03 10*3/MM3 (ref 0–0.05)
IMM GRANULOCYTES NFR BLD AUTO: 0.3 % (ref 0–0.5)
LAB AP CASE REPORT: NORMAL
LAB AP DIAGNOSIS COMMENT: NORMAL
LAB AP SYNOPTIC CHECKLIST: NORMAL
LYMPHOCYTES # BLD AUTO: 2.47 10*3/MM3 (ref 0.7–3.1)
LYMPHOCYTES NFR BLD AUTO: 25 % (ref 19.6–45.3)
MCH RBC QN AUTO: 29.2 PG (ref 26.6–33)
MCHC RBC AUTO-ENTMCNC: 33.1 G/DL (ref 31.5–35.7)
MCV RBC AUTO: 88.1 FL (ref 79–97)
MONOCYTES # BLD AUTO: 1.22 10*3/MM3 (ref 0.1–0.9)
MONOCYTES NFR BLD AUTO: 12.3 % (ref 5–12)
NEUTROPHILS NFR BLD AUTO: 6.13 10*3/MM3 (ref 1.7–7)
NEUTROPHILS NFR BLD AUTO: 62 % (ref 42.7–76)
NRBC BLD AUTO-RTO: 0 /100 WBC (ref 0–0.2)
PATH REPORT.FINAL DX SPEC: NORMAL
PATH REPORT.GROSS SPEC: NORMAL
PLATELET # BLD AUTO: 200 10*3/MM3 (ref 140–450)
PMV BLD AUTO: 9 FL (ref 6–12)
POTASSIUM SERPL-SCNC: 3.9 MMOL/L (ref 3.5–5.2)
RBC # BLD AUTO: 3.87 10*6/MM3 (ref 3.77–5.28)
SODIUM SERPL-SCNC: 139 MMOL/L (ref 136–145)
WBC NRBC COR # BLD AUTO: 9.89 10*3/MM3 (ref 3.4–10.8)

## 2024-02-10 PROCEDURE — 25010000002 ENOXAPARIN PER 10 MG: Performed by: SURGERY

## 2024-02-10 PROCEDURE — 99024 POSTOP FOLLOW-UP VISIT: CPT | Performed by: SURGERY

## 2024-02-10 PROCEDURE — 80048 BASIC METABOLIC PNL TOTAL CA: CPT | Performed by: SURGERY

## 2024-02-10 PROCEDURE — 85025 COMPLETE CBC W/AUTO DIFF WBC: CPT | Performed by: SURGERY

## 2024-02-10 PROCEDURE — 82378 CARCINOEMBRYONIC ANTIGEN: CPT | Performed by: SURGERY

## 2024-02-10 RX ORDER — ENOXAPARIN SODIUM 100 MG/ML
40 INJECTION SUBCUTANEOUS EVERY 12 HOURS
Status: DISCONTINUED | OUTPATIENT
Start: 2024-02-10 | End: 2024-02-12 | Stop reason: HOSPADM

## 2024-02-10 RX ADMIN — OXYCODONE HYDROCHLORIDE AND ACETAMINOPHEN 1 TABLET: 5; 325 TABLET ORAL at 08:43

## 2024-02-10 RX ADMIN — OXYCODONE HYDROCHLORIDE AND ACETAMINOPHEN 1 TABLET: 5; 325 TABLET ORAL at 19:44

## 2024-02-10 RX ADMIN — ENOXAPARIN SODIUM 40 MG: 100 INJECTION SUBCUTANEOUS at 21:10

## 2024-02-10 RX ADMIN — OXYCODONE HYDROCHLORIDE AND ACETAMINOPHEN 1 TABLET: 5; 325 TABLET ORAL at 03:14

## 2024-02-10 RX ADMIN — ENOXAPARIN SODIUM 40 MG: 100 INJECTION SUBCUTANEOUS at 08:31

## 2024-02-10 RX ADMIN — OXYCODONE HYDROCHLORIDE AND ACETAMINOPHEN 1 TABLET: 5; 325 TABLET ORAL at 14:45

## 2024-02-10 NOTE — PLAN OF CARE
Goal Outcome Evaluation:           Progress: improving  Outcome Evaluation: VSS on RA. Ambulated on unit, tolerated well. Slept between care most of shift. Catheter in place to be removed in Am. Percocet for pain. No c/o of n/v. Ice to surgical site. Will continue to monitor.

## 2024-02-10 NOTE — PLAN OF CARE
Goal Outcome Evaluation:              Outcome Evaluation: VSS. IV fluids running 50cc/hr. Pt resting comfortably. Tolerated small amout clear liquid. Dilauded for pain. Wilson to BSD to be d/c tomorrow am.

## 2024-02-10 NOTE — PLAN OF CARE
Goal Outcome Evaluation:  Plan of Care Reviewed With: patient        Progress: improving  Outcome Evaluation: Went over the plan of care and anwered all questions. Vitals stable and pain is well controlled. Patient is tolerating her diet and patient up ambulating in the halls ad miriam. All meds given without complications. No other issues this shift.

## 2024-02-10 NOTE — PROGRESS NOTES
Chief Complaint:    S/P Da Kat robot assisted laparoscopic sigmoid colon resection, POD 1    Subjective:    The patient is feeling well with only expected postop abdominal pain.  She has been up and ambulating.  She has a little nausea after ambulating but otherwise there has been no nausea or vomiting.  She is tolerating a clear liquid diet.  She has passed some flatus but no bowel movement.    Objective:    Temp:  [97.4 °F (36.3 °C)-98.3 °F (36.8 °C)] 97.7 °F (36.5 °C)  Heart Rate:  [] 96  Resp:  [16] 16  BP: ()/(64-93) 114/73    Physical Exam  Constitutional:       Appearance: She is not ill-appearing or toxic-appearing.   Pulmonary:      Effort: Pulmonary effort is normal. No respiratory distress.   Abdominal:      General: Bowel sounds are normal. There is no distension.      Palpations: Abdomen is soft.      Tenderness: There is generalized abdominal tenderness (Appropriate postop tenderness).      Comments: Incisions: Intact with no evidence of infection.   Neurological:      Mental Status: She is alert.   Psychiatric:         Behavior: Behavior is cooperative.           Results:    WBC is 9.89.  H/H is 11.3/34.1.  BUN is 10 and creatinine 0.83.  CEA 0.78.    Impression/Plan:    The patient is POD 1 from a da Kat robot-assisted laparoscopic sigmoid colon resection for a sigmoid colon cancer.  She is recovering well but has an expected postop ileus.  We will slowly advance her diet.    She was encouraged to continue to ambulate around the nurses unit.    She is drinking liquids adequately and has a normal BUN and creatinine.  Her IV fluids will be stopped.    Lovenox has been started for DVT prophylaxis.    Reji Mcdowell Jr., M.D.

## 2024-02-11 PROCEDURE — 25010000002 ENOXAPARIN PER 10 MG: Performed by: SURGERY

## 2024-02-11 PROCEDURE — 99024 POSTOP FOLLOW-UP VISIT: CPT | Performed by: SURGERY

## 2024-02-11 RX ADMIN — ENOXAPARIN SODIUM 40 MG: 100 INJECTION SUBCUTANEOUS at 08:32

## 2024-02-11 RX ADMIN — OXYCODONE HYDROCHLORIDE AND ACETAMINOPHEN 1 TABLET: 5; 325 TABLET ORAL at 08:32

## 2024-02-11 RX ADMIN — SILVER SULFADIAZINE 1 APPLICATION: 10 CREAM TOPICAL at 20:44

## 2024-02-11 RX ADMIN — OXYCODONE HYDROCHLORIDE AND ACETAMINOPHEN 1 TABLET: 5; 325 TABLET ORAL at 20:53

## 2024-02-11 RX ADMIN — OXYCODONE HYDROCHLORIDE AND ACETAMINOPHEN 1 TABLET: 5; 325 TABLET ORAL at 15:59

## 2024-02-11 RX ADMIN — SILVER SULFADIAZINE 1 APPLICATION: 10 CREAM TOPICAL at 15:59

## 2024-02-11 RX ADMIN — ENOXAPARIN SODIUM 40 MG: 100 INJECTION SUBCUTANEOUS at 20:44

## 2024-02-11 NOTE — PLAN OF CARE
Goal Outcome Evaluation:  Plan of Care Reviewed With: patient        Progress: improving  Outcome Evaluation: Went over the plan of care and answered all questions. Vitals stable and pain is well controlled. Patient up ad miriam and ambulating in the halls. Vitals stable and pain is well controlled .Paitent tolerating her diet and passing gas and having bowel movements. No other issues this shift.

## 2024-02-11 NOTE — PLAN OF CARE
Goal Outcome Evaluation:  Plan of Care Reviewed With: patient           Outcome Evaluation: VSS, pt walked unit, voiding freely, tolerating diet, no c/o nausea, pain well controlled with PRN pain meds, saline locked, dressings dry and intact with marked dried drainage, continuing with plan of care.

## 2024-02-11 NOTE — PROGRESS NOTES
Chief Complaint:    S/P Da Kat robot assisted laparoscopic sigmoid colon resection, POD 2    Subjective:    The patient is feeling well with only expected postop abdominal pain.  She is tolerating for liquid diet with no nausea or vomiting.  She has passed flatus and had a bowel movement.  She has been up and ambulate without difficulty.    Objective:    Temp:  [97.4 °F (36.3 °C)-98.8 °F (37.1 °C)] 97.7 °F (36.5 °C)  Heart Rate:  [] 90  Resp:  [16-18] 18  BP: (112-129)/(78-87) 123/86    Physical Exam  Constitutional:       Appearance: She is not ill-appearing or toxic-appearing.   Pulmonary:      Effort: Pulmonary effort is normal. No respiratory distress.   Abdominal:      Palpations: Abdomen is soft.      Tenderness: There is generalized abdominal tenderness (Appropriate postop tenderness).   Neurological:      Mental Status: She is alert.   Psychiatric:         Behavior: Behavior is cooperative.           Results:    WBC is 9.89.  H/H is 11.3/34.1.  BUN is 10 and creatinine 0.83.  The pathology shows a moderately differentiated adenocarcinoma with no lymph node metastases and 18 lymph nodes.  It is a T3 N0 M0 lesion.    Impression/Plan:    The patient is POD 2 from a da Kat robot-assisted laparoscopic sigmoid colon resection.  She has an expected postop ileus that is resolving nicely.  Her diet will be advanced.    I anticipate she should be ready for discharge home tomorrow.    Reji Mcdowell Jr., M.D.

## 2024-02-12 ENCOUNTER — READMISSION MANAGEMENT (OUTPATIENT)
Dept: CALL CENTER | Facility: HOSPITAL | Age: 39
End: 2024-02-12
Payer: COMMERCIAL

## 2024-02-12 VITALS
OXYGEN SATURATION: 99 % | HEIGHT: 62 IN | HEART RATE: 89 BPM | RESPIRATION RATE: 18 BRPM | BODY MASS INDEX: 44.53 KG/M2 | SYSTOLIC BLOOD PRESSURE: 130 MMHG | WEIGHT: 242 LBS | TEMPERATURE: 97.4 F | DIASTOLIC BLOOD PRESSURE: 82 MMHG

## 2024-02-12 LAB
ANION GAP SERPL CALCULATED.3IONS-SCNC: 9.8 MMOL/L (ref 5–15)
ANISOCYTOSIS BLD QL: NORMAL
BUN SERPL-MCNC: 10 MG/DL (ref 6–20)
BUN/CREAT SERPL: 12.7 (ref 7–25)
CALCIUM SPEC-SCNC: 8.2 MG/DL (ref 8.6–10.5)
CHLORIDE SERPL-SCNC: 107 MMOL/L (ref 98–107)
CO2 SERPL-SCNC: 20.2 MMOL/L (ref 22–29)
CREAT SERPL-MCNC: 0.79 MG/DL (ref 0.57–1)
DEPRECATED RDW RBC AUTO: 39 FL (ref 37–54)
EGFRCR SERPLBLD CKD-EPI 2021: 98.3 ML/MIN/1.73
EOSINOPHIL # BLD MANUAL: 0.15 10*3/MM3 (ref 0–0.4)
EOSINOPHIL NFR BLD MANUAL: 2 % (ref 0.3–6.2)
ERYTHROCYTE [DISTWIDTH] IN BLOOD BY AUTOMATED COUNT: 12.3 % (ref 12.3–15.4)
GLUCOSE SERPL-MCNC: 90 MG/DL (ref 65–99)
HCT VFR BLD AUTO: 32.7 % (ref 34–46.6)
HGB BLD-MCNC: 10.8 G/DL (ref 12–15.9)
LYMPHOCYTES # BLD MANUAL: 2.68 10*3/MM3 (ref 0.7–3.1)
LYMPHOCYTES NFR BLD MANUAL: 8.2 % (ref 5–12)
MCH RBC QN AUTO: 29 PG (ref 26.6–33)
MCHC RBC AUTO-ENTMCNC: 33 G/DL (ref 31.5–35.7)
MCV RBC AUTO: 87.9 FL (ref 79–97)
MICROCYTES BLD QL: NORMAL
MONOCYTES # BLD: 0.6 10*3/MM3 (ref 0.1–0.9)
NEUTROPHILS # BLD AUTO: 3.87 10*3/MM3 (ref 1.7–7)
NEUTROPHILS NFR BLD MANUAL: 53.1 % (ref 42.7–76)
PLAT MORPH BLD: NORMAL
PLATELET # BLD AUTO: 201 10*3/MM3 (ref 140–450)
PMV BLD AUTO: 9.5 FL (ref 6–12)
POLYCHROMASIA BLD QL SMEAR: NORMAL
POTASSIUM SERPL-SCNC: 3.9 MMOL/L (ref 3.5–5.2)
RBC # BLD AUTO: 3.72 10*6/MM3 (ref 3.77–5.28)
SMUDGE CELLS BLD QL SMEAR: NORMAL
SODIUM SERPL-SCNC: 137 MMOL/L (ref 136–145)
VARIANT LYMPHS NFR BLD MANUAL: 36.7 % (ref 19.6–45.3)
WBC NRBC COR # BLD AUTO: 7.29 10*3/MM3 (ref 3.4–10.8)

## 2024-02-12 PROCEDURE — 85027 COMPLETE CBC AUTOMATED: CPT | Performed by: SURGERY

## 2024-02-12 PROCEDURE — 85007 BL SMEAR W/DIFF WBC COUNT: CPT | Performed by: SURGERY

## 2024-02-12 PROCEDURE — 25010000002 ENOXAPARIN PER 10 MG: Performed by: SURGERY

## 2024-02-12 PROCEDURE — 80048 BASIC METABOLIC PNL TOTAL CA: CPT | Performed by: SURGERY

## 2024-02-12 RX ORDER — OXYCODONE HYDROCHLORIDE AND ACETAMINOPHEN 5; 325 MG/1; MG/1
1 TABLET ORAL EVERY 6 HOURS PRN
Qty: 20 TABLET | Refills: 0 | Status: SHIPPED | OUTPATIENT
Start: 2024-02-12

## 2024-02-12 RX ADMIN — SILVER SULFADIAZINE 1 APPLICATION: 10 CREAM TOPICAL at 08:22

## 2024-02-12 RX ADMIN — OXYCODONE HYDROCHLORIDE AND ACETAMINOPHEN 1 TABLET: 5; 325 TABLET ORAL at 06:44

## 2024-02-12 RX ADMIN — ENOXAPARIN SODIUM 40 MG: 100 INJECTION SUBCUTANEOUS at 08:22

## 2024-02-12 NOTE — PAYOR COMM NOTE
"Agustina Cisneros (38 y.o. Female)          DC SUMMARY FOR [id number]        Date of Birth   1985    Social Security Number       Address   [address] [place] [address]    Home Phone   [phone]    MRN   [id number]       Mormonism   None    Marital Status                               Admission Date   2/9/24    Admission Type   Elective    Admitting Provider   Reji Mcdowell Jr., MD    Attending Provider       Department, Room/Bed   [address], 87/1       Discharge Date   2/12/2024    Discharge Disposition   Home or Self Care    Discharge Destination                                 Attending Provider: (none)   Allergies: Adhesive Tape    Isolation: None   Infection: None   Code Status: CPR    Ht: 157.5 cm (62\")   Wt: 110 kg (242 lb)    Admission Cmt: None   Principal Problem: Malignant neoplasm of sigmoid colon [C18.7]                   Active Insurance as of 2/9/2024       Primary Coverage       Payor Plan Insurance Group Employer/Plan Group    [employer]HE[employer] BLUE CROSS [employer]M BLUE CROSS BLUE SHIELD PPO [id number]       Payor Plan Address Payor Plan Phone Number Payor Plan Fax Number Effective Dates    [address] [phone]  7/25/2017 - None Entered    [place] [address]         Subscriber Name Subscriber Birth Date Member ID       AGUSTINA CISNEROS 1985 [id number]                     Emergency Contacts        (Rel.) Home Phone Work Phone Mobile Phone    Dani Cisneros (Spouse) -- -- [phone]                 Discharge Summary        Reji Mcdowell Jr., MD at 02/12/24 0951          Discharge Summary    Date of admission: 2/9/2024    Date of discharge: 2/12/2024    Final diagnosis:    1.  Adenocarcinoma of the sigmoid colon, T3 N0 M0    Procedures performed during admission:    1.  Da Kat robot assisted laparoscopic sigmoid colon resection on 2/9/2024    Consulting physicians:    1.  None    Reason for admission:    The patient is a " pleasant 38-year-old female who underwent a colonoscopy for rectal bleeding.  She was found to have an adenocarcinoma of the sigmoid colon.  She was admitted for da Kat robot assisted laparoscopic sigmoid colon resection.    Hospital course:    The patient was admitted on 2/9/2024 and underwent a da Kat robot-assisted laparoscopic sigmoid colon resection.  Postoperatively she was transferred to the floor where she did quite well.  Her expected postop ileus cleared quickly and her diet was advanced.  She was up and ambulating without difficulty.  She remained afebrile and hemodynamically stable.  By 2/12/2024 she was felt ready for discharge home.    Prescriptions:    She was given a prescription for Percocet.    Follow-up:    She will follow-up with me in 2 weeks.    Reji Mcdowell Jr., M.D.     Electronically signed by Reji Mcdowell Jr., MD at 02/12/24 0903

## 2024-02-12 NOTE — DISCHARGE SUMMARY
Discharge Summary    Date of admission: 2/9/2024    Date of discharge: 2/12/2024    Final diagnosis:    1.  Adenocarcinoma of the sigmoid colon, T3 N0 M0    Procedures performed during admission:    1.  Da Kat robot assisted laparoscopic sigmoid colon resection on 2/9/2024    Consulting physicians:    1.  None    Reason for admission:    The patient is a pleasant 38-year-old female who underwent a colonoscopy for rectal bleeding.  She was found to have an adenocarcinoma of the sigmoid colon.  She was admitted for da Kat robot assisted laparoscopic sigmoid colon resection.    Hospital course:    The patient was admitted on 2/9/2024 and underwent a da Kat robot-assisted laparoscopic sigmoid colon resection.  Postoperatively she was transferred to the floor where she did quite well.  Her expected postop ileus cleared quickly and her diet was advanced.  She was up and ambulating without difficulty.  She remained afebrile and hemodynamically stable.  By 2/12/2024 she was felt ready for discharge home.    Prescriptions:    She was given a prescription for Percocet.    Follow-up:    She will follow-up with me in 2 weeks.    Reji Mcdowell Jr., M.D.    Pt is scheduled for T4-L4 posterior spinal fusion with instrumentation on 7/26/2023 with Dr. Scott at [facility] [place]

## 2024-02-12 NOTE — NURSING NOTE
VSS. Ambulated in hallway. Lap sites with steri-strips. Silver applied to RUE, with abd pad and kerlix. Discharged home today.

## 2024-02-12 NOTE — PLAN OF CARE
Goal Outcome Evaluation:  Plan of Care Reviewed With: patient           Outcome Evaluation: VSS, walked unit x2, took shower, applied silvadene cream to areas on arm and wrapped with kerlix, tolerating diet, voiding and having BMs, percocet given for c/o pain, saline locked, will continue to monitor.

## 2024-02-12 NOTE — PROGRESS NOTES
Case Management Discharge Note      Final Note: Discharged home. Jia Granda RN         Selected Continued Care - Discharged on 2/12/2024 Admission date: 2/9/2024 - Discharge disposition: Home or Self Care         Transportation Services  Private: Car    Final Discharge Disposition Code: 01 - home or self-care

## 2024-02-13 ENCOUNTER — TELEPHONE (OUTPATIENT)
Dept: SURGERY | Facility: CLINIC | Age: 39
End: 2024-02-13
Payer: COMMERCIAL

## 2024-02-15 ENCOUNTER — READMISSION MANAGEMENT (OUTPATIENT)
Dept: CALL CENTER | Facility: HOSPITAL | Age: 39
End: 2024-02-15
Payer: COMMERCIAL

## 2024-02-27 ENCOUNTER — OFFICE VISIT (OUTPATIENT)
Dept: SURGERY | Facility: CLINIC | Age: 39
End: 2024-02-27
Payer: COMMERCIAL

## 2024-02-27 VITALS
DIASTOLIC BLOOD PRESSURE: 68 MMHG | SYSTOLIC BLOOD PRESSURE: 112 MMHG | WEIGHT: 241 LBS | HEIGHT: 62 IN | BODY MASS INDEX: 44.35 KG/M2

## 2024-02-27 DIAGNOSIS — C18.7 MALIGNANT NEOPLASM OF SIGMOID COLON: Primary | ICD-10-CM

## 2024-02-27 DIAGNOSIS — Z48.89 POSTOPERATIVE VISIT: ICD-10-CM

## 2024-02-27 PROCEDURE — 99024 POSTOP FOLLOW-UP VISIT: CPT | Performed by: SURGERY

## 2024-02-28 NOTE — PROGRESS NOTES
Subjective   Agustina Cisneros is a 38 y.o. female who returns to the office after undergoing a da Kat robot-assisted laparoscopic sigmoid colon resection on 2/9/2024.     History of Present Illness     The patient is recovering well with no significant postop symptoms.  She is having no abdominal pain.  She has a good appetite and normal bowel function.  Her energy level is decreased but improving.      The final pathology showed invasive moderately differentiated adenocarcinoma that invaded through the muscularis propria into pericolonic fat.  There was no lymphovascular space invasion.  There is no evidence of metastatic disease to 18 lymph nodes.  It is a T3 N0 M0 lesion.    Review of Systems   Constitutional:  Negative for activity change, appetite change, fatigue and fever.   Respiratory:  Negative for chest tightness and shortness of breath.    Cardiovascular:  Negative for chest pain and palpitations.   Gastrointestinal:  Negative for abdominal pain, constipation, diarrhea and nausea.   Skin:  Negative for rash and wound.   Psychiatric/Behavioral:  Negative for agitation and confusion.        Objective   Physical Exam  Constitutional:       General: She is not in acute distress.     Appearance: Normal appearance. She is not ill-appearing or toxic-appearing.   Pulmonary:      Effort: Pulmonary effort is normal. No respiratory distress.   Abdominal:      Palpations: Abdomen is soft.      Tenderness: There is no abdominal tenderness.   Skin:     Comments: Incision: intact with no evidence of infection.   Neurological:      Mental Status: She is alert.   Psychiatric:         Behavior: Behavior normal.                 Assessment & Plan     1.  Postoperative visit: The patient is recovering well from her da Kat robot-assisted laparoscopic sigmoid colon resection.  Her bowel function has returned nicely.  She will be referred to oncology for evaluation for chemotherapy.           
[phone]

## 2024-03-05 ENCOUNTER — TELEPHONE (OUTPATIENT)
Dept: SURGERY | Facility: CLINIC | Age: 39
End: 2024-03-05
Payer: COMMERCIAL

## 2024-03-05 NOTE — TELEPHONE ENCOUNTER
SPOKE TO PT REGARDING AN UPCOMING APPT WITH DR MULTANI ON 3/14 @ 11 AM FOR THE [place] [facility]. PT STATED THAT SHE TALKED TO THE OFFICE EARLIER BECAUSE IT'S SUPPOSED TO BE SCHEDULED WITH DR VAUGHN. I LET HER KNOW THAT I'LL WATCH THE REFERRAL AND IF THE DATE & TIME CHANGES, I'LL GIVE HER A CALL.

## 2024-03-13 LAB
LAB AP CASE REPORT: NORMAL
LAB AP DIAGNOSIS COMMENT: NORMAL
LAB AP SYNOPTIC CHECKLIST: NORMAL
PATH REPORT.ADDENDUM SPEC: NORMAL
PATH REPORT.FINAL DX SPEC: NORMAL
PATH REPORT.GROSS SPEC: NORMAL

## 2024-10-25 ENCOUNTER — TELEPHONE (OUTPATIENT)
Dept: OBSTETRICS AND GYNECOLOGY | Age: 39
End: 2024-10-25
Payer: COMMERCIAL

## 2024-10-27 RX ORDER — ETONOGESTREL AND ETHINYL ESTRADIOL VAGINAL RING .015; .12 MG/D; MG/D
1 RING VAGINAL
Qty: 1 EACH | Refills: 4 | Status: SHIPPED | OUTPATIENT
Start: 2024-10-27 | End: 2025-10-27

## 2024-12-12 ENCOUNTER — TELEPHONE (OUTPATIENT)
Dept: SURGERY | Facility: CLINIC | Age: 39
End: 2024-12-12
Payer: COMMERCIAL

## 2024-12-12 NOTE — TELEPHONE ENCOUNTER
Provider: EDDIE    Caller: Agustina Cisneros    Relationship to Patient: Self    Phone Number: [phone]    Reason for Call: PATIENT ASKS IF NEEDS AN OFFICE VISIT OR IF CAN GO AHEAD AND SCHEDULE COLONOSCOPY THAT IS NEEDED 1 YEAR AFTER SURGERY     When was the patient last seen: 02/24/24

## 2024-12-13 DIAGNOSIS — Z85.038 HISTORY OF COLON CANCER: Primary | ICD-10-CM

## 2024-12-23 ENCOUNTER — OFFICE VISIT (OUTPATIENT)
Dept: OBSTETRICS AND GYNECOLOGY | Age: 39
End: 2024-12-23
Payer: COMMERCIAL

## 2024-12-23 ENCOUNTER — TELEPHONE (OUTPATIENT)
Dept: OBSTETRICS AND GYNECOLOGY | Age: 39
End: 2024-12-23

## 2024-12-23 VITALS
SYSTOLIC BLOOD PRESSURE: 128 MMHG | WEIGHT: 260 LBS | HEIGHT: 62 IN | BODY MASS INDEX: 47.84 KG/M2 | DIASTOLIC BLOOD PRESSURE: 88 MMHG

## 2024-12-23 DIAGNOSIS — Z13.89 SCREENING FOR BLOOD OR PROTEIN IN URINE: ICD-10-CM

## 2024-12-23 DIAGNOSIS — Z11.51 SCREENING FOR HUMAN PAPILLOMAVIRUS (HPV): ICD-10-CM

## 2024-12-23 DIAGNOSIS — Z01.419 WELL FEMALE EXAM WITH ROUTINE GYNECOLOGICAL EXAM: Primary | ICD-10-CM

## 2024-12-23 DIAGNOSIS — Z12.4 SCREENING FOR MALIGNANT NEOPLASM OF CERVIX: ICD-10-CM

## 2024-12-23 DIAGNOSIS — C18.7 MALIGNANT NEOPLASM OF SIGMOID COLON: ICD-10-CM

## 2024-12-23 DIAGNOSIS — E66.01 MORBID OBESITY WITH BMI OF 45.0-49.9, ADULT: ICD-10-CM

## 2024-12-23 PROBLEM — K62.5 RECTAL BLEEDING: Status: RESOLVED | Noted: 2023-11-22 | Resolved: 2024-12-23

## 2024-12-23 LAB
BILIRUB BLD-MCNC: NEGATIVE MG/DL
CLARITY, POC: CLEAR
COLOR UR: YELLOW
GLUCOSE UR STRIP-MCNC: NEGATIVE MG/DL
KETONES UR QL: NEGATIVE
LEUKOCYTE EST, POC: NEGATIVE
NITRITE UR-MCNC: NEGATIVE MG/ML
PH UR: 6 [PH] (ref 5–8)
PROT UR STRIP-MCNC: NEGATIVE MG/DL
RBC # UR STRIP: NEGATIVE /UL
SP GR UR: 1.02 (ref 1–1.03)
UROBILINOGEN UR QL: NORMAL

## 2024-12-23 RX ORDER — NARATRIPTAN 2.5 MG/1
TABLET ORAL
COMMUNITY
Start: 2024-11-09

## 2024-12-23 NOTE — PROGRESS NOTES
[facility]   Obstetrics and Gynecology     2024    Patient: Agustina Cisneros          MR#:[id number]    History of Present Illness    Chief Complaint   Patient presents with    Gynecologic Exam     Annual, last pap 23 neg, HPV-  No complaints       39 y.o. female  who presents for annual exam.    The patient presents for her regular exam feeling well without complaints      Relevant data reviewed:    Patient's last menstrual period was 2024.  Obstetric History:  OB History          2    Para   2    Term   2            AB        Living   2         SAB        IAB        Ectopic        Molar        Multiple        Live Births   2               Menstrual History:     Patient's last menstrual period was 2024.       Social History     Substance and Sexual Activity   Sexual Activity Yes    Partners: Male    Birth control/protection: Vaginal contraceptive ring, Inserts    Comment: nuva ring     ______________________________________  Patient Active Problem List   Diagnosis    Morbid obesity with BMI of 45.0-49.9, adult    Malignant neoplasm of sigmoid colon    Cancer of sigmoid colon    History of colon cancer     Past Medical History:   Diagnosis Date    Colon cancer 24    GERD (gastroesophageal reflux disease)     Migraines     PMS (premenstrual syndrome)     Varicella      Past Surgical History:   Procedure Laterality Date     SECTION  2012    X1    COLON RESECTION N/A 2024    Procedure: DaVinci robot-assisted laparoscopic sigmoid colon resection;  Surgeon: Reji Mcdowell Jr., MD;  Location: [facility] OR;  Service: Robotics - DaVinci;  Laterality: N/A;    COLONOSCOPY N/A 2024    Procedure: COLONOSCOPY to cecum with rectosigmoid mass biopsies and 3ml tattoo;  Surgeon: Reji Mcdowell Jr., MD;  Location: [facility] ENDOSCOPY;  Service: General;  Laterality: N/A;  pre- rectal bleeding  post- rectosigmoid mass    WISDOM TOOTH EXTRACTION    "    Social History     Tobacco Use   Smoking Status Never   Smokeless Tobacco Never     Family History   Problem Relation Age of Onset    Asthma Mother     Diabetes Father     Lung cancer Maternal Grandmother 75    Diabetes Maternal Grandmother     Prostate cancer Maternal Grandfather 78    Breast cancer Neg Hx     Ovarian cancer Neg Hx     Uterine cancer Neg Hx     Colon cancer Neg Hx     Melanoma Neg Hx     Malig Hyperthermia Neg Hx      Prior to Admission medications    Medication Sig Start Date End Date Taking? Authorizing Provider   naratriptan (AMERGE) 2.5 MG tablet TAKE 1 TABLET BY MOUTH ONCE A DAY AS NEEDED FOR MIGRAINE HEADACHE 11/9/24  Yes ProviderAntonio MD   etonogestrel-ethinyl estradiol (NuvaRing) 0.12-0.015 MG/24HR vaginal ring Insert 1 each into the vagina Every 28 (Twenty-Eight) Days. Insert vaginally and leave in place for 3 consecutive weeks, then remove for 1 week. 10/27/24 12/23/24  Merrick Naranjo MD     _______________________________________    Current contraception: condoms  History of abnormal Pap smear: no  Family history of uterine or ovarian cancer: no  Family History of colon cancer/colon polyps: no  History of abnormal mammogram: no  History of abnormal lipids: no    The following portions of the patient's history were reviewed and updated as appropriate: allergies, current medications, past family history, past medical history, past social history, past surgical history, and problem list.    Review of Systems    Pertinent items are noted in HPI.       Objective   Physical Exam    /88   Ht 157.5 cm (62\")   Wt 118 kg (260 lb)   LMP 12/12/2024   BMI 47.55 kg/m²    BP Readings from Last 3 Encounters:   12/23/24 128/88   02/27/24 112/68   02/12/24 130/82      Wt Readings from Last 3 Encounters:   12/23/24 118 kg (260 lb)   02/27/24 109 kg (241 lb)   02/09/24 110 kg (242 lb)        BMI: Estimated body mass index is 47.55 kg/m² as calculated from the following:    Height as " "of this encounter: 157.5 cm (62\").    Weight as of this encounter: 118 kg (260 lb).       General: alert, appears stated age, and cooperative   Heart: regular rate and rhythm, S1, S2 normal, no murmur, click, rub or gallop   Lungs: clear to auscultation bilaterally   Abdomen: soft, non-tender, without masses, no organomegaly   Breast: inspection negative, no nipple discharge or bleeding, no masses or nodularity palpable   External genitalia/Vulva: External genitalia including bartholin's glands, Urethra, Garfield's gland and urethra meatus are normal, Perineum, rectum and anus appear normal , and Bladder appears normal without significant prolapse    Vagina: normal mucosa, normal discharge   Cervix: no lesions   Uterus: normal size and non-tender   Adnexa: normal adnexa     As part of wellness and prevention, the following topics were discussed with the patient:  Encouraged self breast exam  Physical activity and regular exercised encouraged.         Problem List   Meds  History  Prep for Surg   Imagin}    Assessment:  Diagnoses and all orders for this visit:    1. Well female exam with routine gynecological exam (Primary)  -     IGP, Apt HPV,rfx 16 / 18,45    2. Screening for human papillomavirus (HPV)  -     IGP, Apt HPV,rfx 16 / 18,45    3. Screening for malignant neoplasm of cervix  -     IGP, Apt HPV,rfx 16 / 18,45    4. Screening for blood or protein in urine  -     POC Urinalysis Dipstick    5. Morbid obesity with BMI of 45.0-49.9, adult    6. Malignant neoplasm of sigmoid colon      Plan:  Return in 1 year (on 2025) for Annual exam.    Merrick Naranjo MD  2024 11:22 EST  "

## 2024-12-30 LAB
CYTOLOGIST CVX/VAG CYTO: NORMAL
CYTOLOGY CVX/VAG DOC CYTO: NORMAL
CYTOLOGY CVX/VAG DOC THIN PREP: NORMAL
DX ICD CODE: NORMAL
HPV I/H RISK 4 DNA CVX QL PROBE+SIG AMP: NEGATIVE
Lab: NORMAL
OTHER STN SPEC: NORMAL
STAT OF ADQ CVX/VAG CYTO-IMP: NORMAL

## 2025-01-31 ENCOUNTER — HOSPITAL ENCOUNTER (OUTPATIENT)
Facility: HOSPITAL | Age: 40
Setting detail: HOSPITAL OUTPATIENT SURGERY
Discharge: HOME | End: 2025-01-31
Attending: SURGERY | Admitting: SURGERY
Payer: COMMERCIAL

## 2025-01-31 ENCOUNTER — ANESTHESIA (OUTPATIENT)
Dept: GASTROENTEROLOGY | Facility: HOSPITAL | Age: 40
End: 2025-01-31
Payer: COMMERCIAL

## 2025-01-31 ENCOUNTER — ANESTHESIA EVENT (OUTPATIENT)
Dept: GASTROENTEROLOGY | Facility: HOSPITAL | Age: 40
End: 2025-01-31
Payer: COMMERCIAL

## 2025-01-31 VITALS
RESPIRATION RATE: 16 BRPM | DIASTOLIC BLOOD PRESSURE: 87 MMHG | OXYGEN SATURATION: 97 % | WEIGHT: 262.2 LBS | BODY MASS INDEX: 49.5 KG/M2 | HEART RATE: 80 BPM | SYSTOLIC BLOOD PRESSURE: 129 MMHG | HEIGHT: 61 IN

## 2025-01-31 DIAGNOSIS — Z85.038 HISTORY OF COLON CANCER: ICD-10-CM

## 2025-01-31 PROCEDURE — 88305 TISSUE EXAM BY PATHOLOGIST: CPT | Performed by: SURGERY

## 2025-01-31 PROCEDURE — 81025 URINE PREGNANCY TEST: CPT | Performed by: SURGERY

## 2025-01-31 PROCEDURE — 45380 COLONOSCOPY AND BIOPSY: CPT | Performed by: SURGERY

## 2025-01-31 PROCEDURE — 25010000002 LIDOCAINE 2% SOLUTION: Performed by: NURSE ANESTHETIST, CERTIFIED REGISTERED

## 2025-01-31 PROCEDURE — 25010000002 PROPOFOL 200 MG/20ML EMULSION: Performed by: NURSE ANESTHETIST, CERTIFIED REGISTERED

## 2025-01-31 PROCEDURE — S0260 H&P FOR SURGERY: HCPCS | Performed by: SURGERY

## 2025-01-31 PROCEDURE — 25810000003 LACTATED RINGERS PER 1000 ML: Performed by: SURGERY

## 2025-01-31 RX ORDER — SODIUM CHLORIDE 9 MG/ML
40 INJECTION, SOLUTION INTRAVENOUS AS NEEDED
Status: DISCONTINUED | OUTPATIENT
Start: 2025-01-31 | End: 2025-01-31 | Stop reason: HOSPADM

## 2025-01-31 RX ORDER — PROPOFOL 10 MG/ML
INJECTION, EMULSION INTRAVENOUS CONTINUOUS PRN
Status: DISCONTINUED | OUTPATIENT
Start: 2025-01-31 | End: 2025-01-31 | Stop reason: SURG

## 2025-01-31 RX ORDER — SODIUM CHLORIDE 0.9 % (FLUSH) 0.9 %
10 SYRINGE (ML) INJECTION AS NEEDED
Status: DISCONTINUED | OUTPATIENT
Start: 2025-01-31 | End: 2025-01-31 | Stop reason: HOSPADM

## 2025-01-31 RX ORDER — SODIUM CHLORIDE, SODIUM LACTATE, POTASSIUM CHLORIDE, CALCIUM CHLORIDE 600; 310; 30; 20 MG/100ML; MG/100ML; MG/100ML; MG/100ML
30 INJECTION, SOLUTION INTRAVENOUS CONTINUOUS PRN
Status: DISCONTINUED | OUTPATIENT
Start: 2025-01-31 | End: 2025-01-31 | Stop reason: HOSPADM

## 2025-01-31 RX ORDER — SODIUM CHLORIDE 0.9 % (FLUSH) 0.9 %
10 SYRINGE (ML) INJECTION EVERY 12 HOURS SCHEDULED
Status: DISCONTINUED | OUTPATIENT
Start: 2025-01-31 | End: 2025-01-31 | Stop reason: HOSPADM

## 2025-01-31 RX ORDER — LIDOCAINE HYDROCHLORIDE 20 MG/ML
INJECTION, SOLUTION INFILTRATION; PERINEURAL AS NEEDED
Status: DISCONTINUED | OUTPATIENT
Start: 2025-01-31 | End: 2025-01-31 | Stop reason: SURG

## 2025-01-31 RX ADMIN — LIDOCAINE HYDROCHLORIDE 60 MG: 20 INJECTION, SOLUTION INFILTRATION; PERINEURAL at 09:22

## 2025-01-31 RX ADMIN — PROPOFOL 100 MG: 10 INJECTION, EMULSION INTRAVENOUS at 09:22

## 2025-01-31 RX ADMIN — PROPOFOL 180 MCG/KG/MIN: 10 INJECTION, EMULSION INTRAVENOUS at 09:22

## 2025-01-31 RX ADMIN — SODIUM CHLORIDE, POTASSIUM CHLORIDE, SODIUM LACTATE AND CALCIUM CHLORIDE 30 ML/HR: 600; 310; 30; 20 INJECTION, SOLUTION INTRAVENOUS at 08:38

## 2025-01-31 NOTE — ANESTHESIA PREPROCEDURE EVALUATION
Anesthesia Evaluation     Patient summary reviewed and Nursing notes reviewed   NPO Solid Status: > 8 hours  NPO Liquid Status: > 2 hours           Airway   Mallampati: II  TM distance: >3 FB  Neck ROM: full  No difficulty expected  Dental      Pulmonary - negative pulmonary ROS   Cardiovascular - negative cardio ROS  Exercise tolerance: good (4-7 METS)        Neuro/Psych  (+) headaches  GI/Hepatic/Renal/Endo    (+) morbid obesity, GERD, GI bleeding lower resolved  (-)  obesity    Musculoskeletal (-) negative ROS    Abdominal   (+) obese   Substance History - negative use     OB/GYN negative ob/gyn ROS         Other                        Anesthesia Plan    ASA 3     MAC     intravenous induction     Pre-procedure education provided      CODE STATUS:

## 2025-01-31 NOTE — H&P
[facility]   HISTORY AND PHYSICAL    Patient Name: Agustina Cisneros  : 1985  MRN: [id number]  Primary Care Physician:  Provider, No Known  Date of admission: 2025    Subjective   Subjective     Chief Complaint: History of colon cancer    History of Present Illness    The patient is a very pleasant 39-year-old female who has a history of sigmoid colon cancer and underwent a da Kat robot-assisted laparoscopic sigmoid colon resection on 2024.  She did not require chemotherapy.  She has done really well since surgery.  She presents for screening colonoscopy in high risk group.    Review of Systems   Constitutional:  Negative for fatigue and fever.   Respiratory:  Negative for chest tightness and shortness of breath.    Cardiovascular:  Negative for chest pain and palpitations.   Gastrointestinal:  Negative for abdominal pain, blood in stool, constipation, diarrhea, nausea and vomiting.        Personal History     Past Medical History:   Diagnosis Date    Colon cancer 24    GERD (gastroesophageal reflux disease)     Migraines     PMS (premenstrual syndrome)     Varicella        Past Surgical History:   Procedure Laterality Date     SECTION  2012    X1    COLON RESECTION N/A 2024    Procedure: DaVinci robot-assisted laparoscopic sigmoid colon resection;  Surgeon: Reji Mcdowell Jr., MD;  Location: [facility] MAIN OR;  Service: Robotics - DaVinci;  Laterality: N/A;    COLONOSCOPY N/A 2024    Procedure: COLONOSCOPY to cecum with rectosigmoid mass biopsies and 3ml tattoo;  Surgeon: Reji Mcdowell Jr., MD;  Location: [facility] ENDOSCOPY;  Service: General;  Laterality: N/A;  pre- rectal bleeding  post- rectosigmoid mass    WISDOM TOOTH EXTRACTION         Family History: family history includes Asthma in her mother; Diabetes in her father and maternal grandmother; Lung cancer (age of onset: 75) in her maternal grandmother; Prostate cancer (age of onset: 78) in her maternal  grandfather. Otherwise pertinent FHx was reviewed and not pertinent to current issue.    Social History:  reports that she has never smoked. She has never used smokeless tobacco. She reports that she does not drink alcohol and does not use drugs.    Home Medications:  naratriptan    Allergies:  Allergies   Allergen Reactions    Adhesive Tape Rash       Objective    Objective     Vitals:   Heart Rate:  [75] 75  Resp:  [14] 14  BP: (128)/(73) 128/73    Physical Exam  Constitutional:       Appearance: Normal appearance. She is well-developed. She is not toxic-appearing.   Eyes:      General: No scleral icterus.  Pulmonary:      Effort: Pulmonary effort is normal. No respiratory distress.   Abdominal:      Palpations: Abdomen is soft.      Tenderness: There is no abdominal tenderness.   Skin:     General: Skin is warm and dry.   Neurological:      Mental Status: She is alert and oriented to person, place, and time.   Psychiatric:         Behavior: Behavior normal.         Thought Content: Thought content normal.         Judgment: Judgment normal.           Assessment & Plan   Assessment / Plan     Brief Patient Summary:  Agustina Cisneros is a 39 y.o. female who has a history of colon cancer.    Active Hospital Problems:  Active Hospital Problems    Diagnosis     **History of colon cancer      Plan: Colonoscopy.  The patient understands the indications, alternatives, risks, and benefits of the procedure and wishes to proceed.       Reji Mcdowell Jr., MD

## 2025-01-31 NOTE — OP NOTE
Surgeon:     Reji Mcdowell Jr., M.D.    Preoperative Diagnosis:     History of colon cancer    Postoperative Diagnosis:     Left colon polyp    Procedure Performed:     Colonoscopy biopsy of left colon    Indications:     The patient is a pleasant 39-year-old female with a history of colon cancer.  She presents for screening colonoscopy in high risk group.  The patient understands the indications, alternatives, risks, and benefits of the procedure and wishes to proceed.    Procedure:     The patient was identified, taken to the endoscopy suite, and place in the left side down decubitus position.  The patient underwent a MAC anesthesia and was appropriately monitored through the case by the anesthesia personnel.  A rectal exam was performed that was normal.  The colonoscope was introduced into the rectum and advanced very carefully to the cecum that was identified by the cecal strap, ileocecal valve, and the appendiceal orifice.  The scope was then slowly withdrawn with careful circumferential examination of the mucosa performed.  The bowel prep was marginal preventing adequate mucosal visualization in certain areas.  The scope was withdrawn.  The patient tolerated the procedure well and was transferred the recovery area in stable condition.     Findings:    The bowel prep was marginal at best with some pooling of stool that could not be completely suctioned in certain areas.  Small polyps could have been missed but there were no large polyps or concerning findings except for a small 4 mm polyp in the left colon that was removed by cold biopsy forceps and retrieved.    Recommendations:     1.  Await pathology results from the polypectomy.  2.  Repeat colonoscopy in 1 year.    Reji Mcdowell Jr., M.D.

## 2025-01-31 NOTE — ANESTHESIA POSTPROCEDURE EVALUATION
"Patient: Agustina Cisneros    Procedure Summary       Date: 01/31/25 Room / Location: [facility] ENDOSCOPY 1 /  [facility]    Anesthesia Start: 0919 Anesthesia Stop: 0946    Procedure: COLONOSCOPY to cecum with cold forcep polypectomy Diagnosis:       History of colon cancer      (History of colon cancer [Z85.038])    Surgeons: Reji Mcdowell Jr., MD Provider: Matt Corcoran MD    Anesthesia Type: MAC ASA Status: 3            Anesthesia Type: MAC    Vitals  Vitals Value Taken Time   /87 01/31/25 1006   Temp     Pulse 78 01/31/25 1007   Resp 16 01/31/25 1006   SpO2 99 % 01/31/25 1007   Vitals shown include unfiled device data.        Post Anesthesia Care and Evaluation    Patient location during evaluation: bedside  Patient participation: complete - patient participated  Level of consciousness: awake and alert  Pain management: adequate    Airway patency: patent  Anesthetic complications: No anesthetic complications    Cardiovascular status: acceptable  Respiratory status: acceptable  Hydration status: acceptable    Comments: /87 (BP Location: Left arm, Patient Position: Lying)   Pulse 80   Resp 16   Ht 154.9 cm (61\")   Wt 119 kg (262 lb 3.2 oz)   LMP 01/01/2025 (Approximate)   SpO2 97%   BMI 49.54 kg/m²     "

## 2025-01-31 NOTE — DISCHARGE INSTRUCTIONS
For the next 24 hours patient needs to be with a responsible adult.    For THE REST OF TODAY DO NOT drive, operate machinery, appliances, drink alcohol, make important decisions or sign legal documents.    Start with a light or bland diet if you are feeling sick to your stomach otherwise advance to regular diet as tolerated.    Follow recommendations on procedure report if provided by your doctor.    Call Dr Mcdowell     Problems may include but not limited to: large amounts of bleeding, trouble breathing, repeated vomiting, severe unrelieved pain, fever or chills.      If biopsies or polyps were taken, MD will call you with the results in about 7 days. If you don't hear from the MD in 2 weeks, call the number above.

## 2025-02-03 LAB
CYTO UR: NORMAL
LAB AP CASE REPORT: NORMAL
PATH REPORT.FINAL DX SPEC: NORMAL
PATH REPORT.GROSS SPEC: NORMAL

## 2025-02-10 ENCOUNTER — TELEPHONE (OUTPATIENT)
Dept: SURGERY | Facility: CLINIC | Age: 40
End: 2025-02-10
Payer: COMMERCIAL

## 2025-02-10 NOTE — TELEPHONE ENCOUNTER
----- Message from Reji Mcdowell sent at 2/10/2025  7:30 AM EST -----  Please contact this patient to inform that the polyp is benign and a repeat colonoscopy is needed in 1 year.  Please place in recall for a colonoscopy in 1 year.  Thanks.

## 2025-02-10 NOTE — TELEPHONE ENCOUNTER
Spoke with patient and informed her of results and recommended follow-up.  She voiced understanding.  1 year recall placed for colonoscopy.

## (undated) DEVICE — Device

## (undated) DEVICE — CANN O2 ETCO2 FITS ALL CONN CO2 SMPL A/ 7IN DISP LF

## (undated) DEVICE — CVR TP ENDOWRIST DAVINCI 8MM DISP

## (undated) DEVICE — THE CARR-LOCKE INJECTION NEEDLE IS A SINGLE USE, DISPOSABLE, FLEXIBLE SHEATH INJECTION NEEDLE USED FOR THE INJECTION OF VARIOUS TYPES OF MEDIA THROUGH FLEXIBLE ENDOSCOPES.

## (undated) DEVICE — GLV SURG PREMIERPRO ORTHO LTX PF SZ7.5 BRN

## (undated) DEVICE — LN SMPL CO2 SHTRM SD STREAM W/M LUER

## (undated) DEVICE — PENCL ES MEGADINE EZ/CLEAN BUTN W/HOLSTR 10FT

## (undated) DEVICE — DRP PCH LNG 9 5/8X18IN

## (undated) DEVICE — MSK ENDO PORT O2 POM ELITE CURAPLEX A/

## (undated) DEVICE — SINGLE-USE BIOPSY FORCEPS: Brand: RADIAL JAW 4

## (undated) DEVICE — SEALER VESL ENDOWRIST DAVINCI/X/XI EX/TP 40D 8MM 1P/U

## (undated) DEVICE — DRP ARM DAVINCI/X/XI DISP BX20

## (undated) DEVICE — TRY CATH UROMETER SIL 16F

## (undated) DEVICE — DEV COND GAS LAP INSUFLOW W/LUER CONN

## (undated) DEVICE — TROC BLADLES ANCHORPORT/OPTI LP 8X120MM 1P/U

## (undated) DEVICE — SUT MNCRYL PLS ANTIB UD 4/0 PS2 18IN

## (undated) DEVICE — SENSR O2 OXIMAX FNGR A/ 18IN NONSTR

## (undated) DEVICE — SOL ANTISTICK CAUTRY ELECTROLUBE LF

## (undated) DEVICE — Device: Brand: BLACK EYE

## (undated) DEVICE — ADAPT CLN BIOGUARD AIR/H2O DISP

## (undated) DEVICE — KT ORCA ORCAPOD DISP STRL

## (undated) DEVICE — SUT PROLN 2/0 CT2 30IN 8411H

## (undated) DEVICE — SOL NACL 0.9PCT 1000ML

## (undated) DEVICE — SYS VISABILITY LAP/SCPE LAPAROVUE CLN WARM 2/PRT 3TO12MM

## (undated) DEVICE — KIT, SETUP, YNK, SCT HNDL, YCON: Brand: MEDLINE

## (undated) DEVICE — CVR HNDL LIGHT HARMONYAIR SURG STRL

## (undated) DEVICE — SEAL UNIV DAVINCI/X/XI 5TO12MM

## (undated) DEVICE — TUBING, SUCTION, 1/4" X 10', STRAIGHT: Brand: MEDLINE

## (undated) DEVICE — OBT BLADLES ENDOWRIST DAVINCI/X/XI 8MM DISP

## (undated) DEVICE — APPL CHLORAPREP HI/LITE 26ML ORNG

## (undated) DEVICE — REDUCR CANN ENDOWRIST DAVINCI/X/XI 12-8MM 1P/U

## (undated) DEVICE — ST TBG AIRSEAL FLTR TRI LUM